# Patient Record
Sex: FEMALE | Race: WHITE | NOT HISPANIC OR LATINO | Employment: UNEMPLOYED | URBAN - NONMETROPOLITAN AREA
[De-identification: names, ages, dates, MRNs, and addresses within clinical notes are randomized per-mention and may not be internally consistent; named-entity substitution may affect disease eponyms.]

---

## 2020-11-19 ENCOUNTER — APPOINTMENT (OUTPATIENT)
Dept: MAMMOGRAPHY | Facility: HOSPITAL | Age: 45
End: 2020-11-19

## 2021-01-19 ENCOUNTER — HOSPITAL ENCOUNTER (OUTPATIENT)
Dept: MAMMOGRAPHY | Facility: HOSPITAL | Age: 46
Discharge: HOME | End: 2021-01-19
Admitting: NURSE PRACTITIONER

## 2021-01-19 DIAGNOSIS — Z12.31 VISIT FOR SCREENING MAMMOGRAM: ICD-10-CM

## 2021-01-19 PROCEDURE — 77067 SCR MAMMO BI INCL CAD: CPT

## 2021-01-19 PROCEDURE — 77063 BREAST TOMOSYNTHESIS BI: CPT | Performed by: RADIOLOGY

## 2021-01-19 PROCEDURE — 77063 BREAST TOMOSYNTHESIS BI: CPT

## 2021-01-19 PROCEDURE — 77067 SCR MAMMO BI INCL CAD: CPT | Performed by: RADIOLOGY

## 2021-04-06 ENCOUNTER — IMMUNIZATION (OUTPATIENT)
Dept: VACCINE CLINIC | Facility: HOSPITAL | Age: 46
End: 2021-04-06

## 2021-04-06 PROCEDURE — 91300 HC SARSCOV02 VAC 30MCG/0.3ML IM: CPT | Performed by: INTERNAL MEDICINE

## 2021-04-06 PROCEDURE — 0001A: CPT | Performed by: INTERNAL MEDICINE

## 2021-04-27 ENCOUNTER — IMMUNIZATION (OUTPATIENT)
Dept: VACCINE CLINIC | Facility: HOSPITAL | Age: 46
End: 2021-04-27

## 2021-04-27 PROCEDURE — 0002A: CPT | Performed by: INTERNAL MEDICINE

## 2021-04-27 PROCEDURE — 91300 HC SARSCOV02 VAC 30MCG/0.3ML IM: CPT | Performed by: INTERNAL MEDICINE

## 2022-03-16 ENCOUNTER — HOSPITAL ENCOUNTER (OUTPATIENT)
Dept: MAMMOGRAPHY | Facility: HOSPITAL | Age: 47
Discharge: HOME | End: 2022-03-16
Admitting: NURSE PRACTITIONER

## 2022-03-16 DIAGNOSIS — Z12.31 VISIT FOR SCREENING MAMMOGRAM: ICD-10-CM

## 2022-03-16 PROCEDURE — 77067 SCR MAMMO BI INCL CAD: CPT | Performed by: RADIOLOGY

## 2022-03-16 PROCEDURE — 77063 BREAST TOMOSYNTHESIS BI: CPT

## 2022-03-16 PROCEDURE — 77063 BREAST TOMOSYNTHESIS BI: CPT | Performed by: RADIOLOGY

## 2022-03-16 PROCEDURE — 77067 SCR MAMMO BI INCL CAD: CPT

## 2022-03-18 ENCOUNTER — APPOINTMENT (OUTPATIENT)
Dept: MAMMOGRAPHY | Facility: HOSPITAL | Age: 47
End: 2022-03-18

## 2022-10-24 ENCOUNTER — HOSPITAL ENCOUNTER (OUTPATIENT)
Dept: GENERAL RADIOLOGY | Facility: HOSPITAL | Age: 47
Discharge: HOME | End: 2022-10-24
Admitting: NURSE PRACTITIONER

## 2022-10-24 ENCOUNTER — TRANSCRIBE ORDERS (OUTPATIENT)
Dept: ADMINISTRATIVE | Facility: HOSPITAL | Age: 47
End: 2022-10-24

## 2022-10-24 DIAGNOSIS — R05.9 COUGH, UNSPECIFIED TYPE: Primary | ICD-10-CM

## 2022-10-24 DIAGNOSIS — R05.9 COUGH, UNSPECIFIED TYPE: ICD-10-CM

## 2022-10-24 PROCEDURE — 71046 X-RAY EXAM CHEST 2 VIEWS: CPT

## 2022-10-24 PROCEDURE — 71046 X-RAY EXAM CHEST 2 VIEWS: CPT | Performed by: RADIOLOGY

## 2022-10-28 ENCOUNTER — TRANSCRIBE ORDERS (OUTPATIENT)
Dept: ADMINISTRATIVE | Facility: HOSPITAL | Age: 47
End: 2022-10-28

## 2022-10-28 DIAGNOSIS — M54.50 LOW BACK PAIN, UNSPECIFIED BACK PAIN LATERALITY, UNSPECIFIED CHRONICITY, UNSPECIFIED WHETHER SCIATICA PRESENT: Primary | ICD-10-CM

## 2022-11-18 ENCOUNTER — HOSPITAL ENCOUNTER (OUTPATIENT)
Dept: MRI IMAGING | Facility: HOSPITAL | Age: 47
Discharge: HOME | End: 2022-11-18
Admitting: NURSE PRACTITIONER

## 2022-11-18 DIAGNOSIS — M54.50 LOW BACK PAIN, UNSPECIFIED BACK PAIN LATERALITY, UNSPECIFIED CHRONICITY, UNSPECIFIED WHETHER SCIATICA PRESENT: ICD-10-CM

## 2022-11-18 PROCEDURE — 72148 MRI LUMBAR SPINE W/O DYE: CPT | Performed by: RADIOLOGY

## 2022-11-18 PROCEDURE — 72148 MRI LUMBAR SPINE W/O DYE: CPT

## 2023-02-09 ENCOUNTER — OFFICE VISIT (OUTPATIENT)
Dept: NEUROSURGERY | Facility: CLINIC | Age: 48
End: 2023-02-09
Payer: COMMERCIAL

## 2023-02-09 VITALS — HEIGHT: 69 IN | WEIGHT: 177 LBS | BODY MASS INDEX: 26.22 KG/M2 | TEMPERATURE: 98.5 F

## 2023-02-09 DIAGNOSIS — G89.29 CHRONIC BILATERAL LOW BACK PAIN WITH RIGHT-SIDED SCIATICA: Primary | ICD-10-CM

## 2023-02-09 DIAGNOSIS — M51.36 DDD (DEGENERATIVE DISC DISEASE), LUMBAR: ICD-10-CM

## 2023-02-09 DIAGNOSIS — F43.9 STRESS: ICD-10-CM

## 2023-02-09 DIAGNOSIS — M54.41 CHRONIC BILATERAL LOW BACK PAIN WITH RIGHT-SIDED SCIATICA: Primary | ICD-10-CM

## 2023-02-09 PROBLEM — F32.A DEPRESSIVE DISORDER: Status: ACTIVE | Noted: 2022-05-20

## 2023-02-09 PROBLEM — M54.50 CHRONIC LOW BACK PAIN: Status: ACTIVE | Noted: 2022-05-20

## 2023-02-09 PROCEDURE — 99204 OFFICE O/P NEW MOD 45 MIN: CPT | Performed by: NEUROLOGICAL SURGERY

## 2023-02-09 RX ORDER — BACLOFEN 10 MG/1
10 TABLET ORAL 2 TIMES DAILY
COMMUNITY

## 2023-02-09 RX ORDER — ALBUTEROL SULFATE 90 UG/1
AEROSOL, METERED RESPIRATORY (INHALATION)
COMMUNITY

## 2023-02-09 RX ORDER — GABAPENTIN 600 MG/1
600 TABLET ORAL 3 TIMES DAILY
COMMUNITY

## 2023-02-09 RX ORDER — BUPROPION HYDROCHLORIDE 75 MG/1
75 TABLET ORAL DAILY
COMMUNITY

## 2023-02-09 NOTE — PATIENT INSTRUCTIONS
Recommendation from visit today with Dr. Moralez:  1.) Appointment with a Pain Management Doctor to help manage your chronic pain.  2.) A few weeks of Physical Therapy  3.) Appointment with our board certified /Pain Psychologist to discuss daily stressors.  4.) MRI right hip  5.) XRAY of hip  6.) XRAY of your back  7.) We will see you back in 90 days, once the above is complete with Dr. Moralez's Physician Assistant.

## 2023-02-09 NOTE — PROGRESS NOTES
NAME: DEAN DIAL   DOS: 2023  : 1975  PCP: Liz Daugherty APRN    Chief Complaint:    Chief Complaint   Patient presents with   • Chronic low back & right leg pain     Work accident in          History of Present Illness:  48 y.o. female   Saw this 48-year-old female in neurosurgical consultation she presents with a complex history of Worker's Comp. injury in .  She is currently filing for disability    She had pain since that time she worked as a dental hygienist and had a fall involving her right-sided buttock area since that time she had right-sided SI joint dysfunction ligamentous laxity SI joint pain with radiation of the right-sided gluteal and right-sided paraspinal area it does seem that it could be discogenic in origin but it could also be related to sciatic nerve bruising gluteal trauma or perhaps arthritis    She denies cauda equina syndrome the pain does not go below the knee she is here for evaluation she has no evidence of upper extremity issues other than right-sided shoulder pain she denies any weakness in her hands she does not smoke she is accompanied by a friend    PMHX  Allergies:  No Known Allergies  Medications    Current Outpatient Medications:   •  albuterol sulfate  (90 Base) MCG/ACT inhaler, Ventolin HFA 90 mcg/actuation aerosol inhaler  INHALE 2 PUFFS BY MOUTH EVERY 4 HOURS AS NEEDED, Disp: , Rfl:   •  baclofen (LIORESAL) 10 MG tablet, Take 10 mg by mouth 2 (Two) Times a Day., Disp: , Rfl:   •  buPROPion (WELLBUTRIN) 75 MG tablet, Take 75 mg by mouth Daily., Disp: , Rfl:   •  gabapentin (NEURONTIN) 600 MG tablet, Take 600 mg by mouth 3 (Three) Times a Day., Disp: , Rfl:   Past Medical History:  Past Medical History:   Diagnosis Date   • Asthma    • Low back pain      Past Surgical History:  Past Surgical History:   Procedure Laterality Date   •  SECTION     • HYSTEROSCOPY ENDOMETRIAL ABLATION       Social Hx:  Social History     Tobacco Use   •  Smoking status: Former     Types: Cigarettes   Vaping Use   • Vaping Use: Never used   Substance Use Topics   • Alcohol use: Never   • Drug use: Never     Family Hx:  Family History   Problem Relation Age of Onset   • Asthma Mother    • Arthritis Mother    • Breast cancer Neg Hx      Review of Systems:        Review of Systems   Constitutional: Positive for activity change, fatigue and unexpected weight change. Negative for appetite change, chills, diaphoresis and fever.   HENT: Negative for congestion, dental problem, drooling, ear discharge, ear pain, facial swelling, hearing loss, mouth sores, nosebleeds, postnasal drip, rhinorrhea, sinus pressure, sinus pain, sneezing, sore throat, tinnitus, trouble swallowing and voice change.    Eyes: Negative for photophobia, pain, discharge, redness, itching and visual disturbance.   Respiratory: Positive for wheezing. Negative for apnea, cough, choking, chest tightness, shortness of breath and stridor.    Cardiovascular: Negative for chest pain, palpitations and leg swelling.   Gastrointestinal: Negative for abdominal distention, abdominal pain, anal bleeding, blood in stool, constipation, diarrhea, nausea, rectal pain and vomiting.   Endocrine: Negative for cold intolerance, heat intolerance, polydipsia, polyphagia and polyuria.   Genitourinary: Negative for decreased urine volume, difficulty urinating, dyspareunia, dysuria, enuresis, flank pain, frequency, genital sores, hematuria, menstrual problem, pelvic pain, urgency, vaginal bleeding, vaginal discharge and vaginal pain.   Musculoskeletal: Positive for back pain and gait problem. Negative for arthralgias, joint swelling, myalgias, neck pain and neck stiffness.   Skin: Negative for color change, pallor, rash and wound.   Allergic/Immunologic: Negative for environmental allergies, food allergies and immunocompromised state.   Neurological: Negative for dizziness, tremors, seizures, syncope, facial asymmetry, speech  difficulty, weakness, light-headedness, numbness and headaches.   Hematological: Negative for adenopathy. Does not bruise/bleed easily.   Psychiatric/Behavioral: Negative for agitation, behavioral problems, confusion, decreased concentration, dysphoric mood, hallucinations, self-injury, sleep disturbance and suicidal ideas. The patient is not nervous/anxious and is not hyperactive.       I have reviewed this note template and all pertinent parts of the review of systems social, family history, surgical history and medication list      Physical Examination:  Vitals:    02/09/23 1014   Temp: 98.5 °F (36.9 °C)      General Appearance:   Well developed, well nourished, well groomed, alert, and cooperative.  Neurological examination:  Neurologic Exam  Vital signs were reviewed and documented in the chart  Patient appeared in good neurologic function with normal comprehension fluent speech  Mood and affect are normal  Sense of smell deferred  COVID mass left in place  Muscle bulk and tone normal  5 out of 5 strength no motor drift  Gait normal intact antalgic  Negative Romberg  No clonus long tract signs or myelopathy    Reflexes symmetric symmetrically brisk no edema noted and extremities skin appears normal  She is quite tight in her hips bilaterally  Straight leg raise sign absent on the left somewhat positive on the right  No signs of intrinsic hip dysfunction on the left positive on the right SI joint pain bilaterally  Back is without any lesions or abnormality  Feet are warm and well perfused  Tender in the gluteal right paraspinal muscles with spasm present      Review of Imaging/DATA:  MRI was personally reviewed of the lumbar spine demonstrates multilevel degenerative disc I compared this to a study in 2015 at L3-4 L4-5 and L5-S1 she has some synovial cyst formation on the right with relatively patent central canal    I see no evidence of clear-cut operative pathology but she could be harboring an remote annular  tear or compressive etiology under ambulation  EMG nerve conduction study was unrevealing  Diagnoses/Plan:    Ms. Osman is a 48 y.o. female   1.  Complex discogenic and regional pain syndrome related to  2.  Lumbar degenerative disc disease L3-4, L4-5, L5-S1 facet arthropathy  3.  SI joint dysfunction right side  4.  Right-sided intrinsic hip dysfunction internal/external rotation of the leg  5.  Possible old sciatic notch bruising secondary to fall and trauma  6.  Chronic pain syndrome right lower extremity  7.  Social stress-single mother filing for disability on fixed income    I explained the risk benefits and expected outcome of major elective surgery for their problem, complications from approach, and infection, the risk of neurologic implications after surgery as well as need for repeat surgeries and most importantly failure to achieve quality of life improvement from the surgery to the patient.  I explained the logic behind deferring any sort of spinal surgery until we sort out some other issues    From an anatomic standpoint she needs  An MRI of the right hip  Lumbar flexion-extension films  Possible repeat EMG nerve conduction study    From a pain management standpoint  She needs a follow-up with an interventional pain management and potentially a spinal cord stimulator trial  Diagnostic injection right transforaminal L4-5    From my standpoint we will support her in her disability application however I explained that I do not feel these types of paper works out    I like her to see Isabella our social and pain psychologist for  Stress management coping skills  Assistance with navigation of disability and work future    I will have her see my PA back in 90 to 120 days for repeat examination to review MRI of the hip joint ensure there is no gluteal pathology as well as check for flexion-extension films if at that time she is still having issues and we have sorted out the above social stressors and everything  else has been deemed to be reasonable and she wishes to proceed I recommend lumbar myelogram with repeat EMG nerve conduction study to look for anatomic issues and compressive etiology.  If that study is negative I would recommend spinal cord stimulator trial

## 2023-02-15 ENCOUNTER — TREATMENT (OUTPATIENT)
Dept: PHYSICAL THERAPY | Facility: CLINIC | Age: 48
End: 2023-02-15
Payer: COMMERCIAL

## 2023-02-15 DIAGNOSIS — M53.86 DECREASED ROM OF LUMBAR SPINE: ICD-10-CM

## 2023-02-15 DIAGNOSIS — G89.29 CHRONIC RIGHT-SIDED LOW BACK PAIN WITH RIGHT-SIDED SCIATICA: Primary | ICD-10-CM

## 2023-02-15 DIAGNOSIS — M54.41 CHRONIC RIGHT-SIDED LOW BACK PAIN WITH RIGHT-SIDED SCIATICA: Primary | ICD-10-CM

## 2023-02-15 DIAGNOSIS — M51.36 DDD (DEGENERATIVE DISC DISEASE), LUMBAR: ICD-10-CM

## 2023-02-15 PROCEDURE — 97162 PT EVAL MOD COMPLEX 30 MIN: CPT | Performed by: PHYSICAL THERAPIST

## 2023-02-15 NOTE — PROGRESS NOTES
"    Physical Therapy Initial Evaluation and Plan of Care    Patient: Fidelina Osman   : 1975  Diagnosis/ICD-10 Code:  Chronic right-sided low back pain with right-sided sciatica [M54.41, G89.29]  Referring practitioner: Israel Moralez MD  Date of Initial Visit: 2/15/2023  Today's Date: 2/15/2023  Patient seen for 1 session         Visit Diagnoses:    ICD-10-CM ICD-9-CM   1. Chronic right-sided low back pain with right-sided sciatica  M54.41 724.2    G89.29 724.3     338.29   2. DDD (degenerative disc disease), lumbar  M51.36 722.52   3. Decreased ROM of lumbar spine  M53.86 724.9         Subjective Questionnaire: Oswestry: 30/50=60% impaired      Subjective Evaluation    History of Present Illness  Onset date: 2014.  Mechanism of injury: Patient notes that she injured her back on 2014.  She states that she was diagnosed with bulging discs, and has previously tried PT and pain management.  Patient reports that the pain starts in her lower back \"above her butt.\"  She notes that it feels like a \"lot of pressure\" in the lower back.  She states that pain is the worst in the right hip.  Patient reports intermittent tingling in the right foot; she also notes burning in the right thigh.  Patient denies any changes in bowel/bladder function.      Patient Occupation: Unemployed Pain  Current pain ratin  At best pain ratin  At worst pain ratin  Location: Lumbar  Quality: burning, pressure, throbbing, knife-like and sharp  Relieving factors: rest, change in position and medications  Aggravating factors: movement, prolonged positioning, lifting, standing, ambulation, squatting, stairs, sleeping and repetitive movement    Diagnostic Tests  X-ray: abnormal    Patient Goals  Patient goals for therapy: decreased pain             Objective          Palpation   Left   Muscle spasm in the erector spinae and lumbar paraspinals.     Right   Muscle spasm in the erector spinae. Tenderness of the gluteus " prachi, gluteus medius and piriformis.     Tenderness     Lumbar Spine  Tenderness in the spinous process.     Right Hip   Tenderness in the PSIS.     Active Range of Motion     Lumbar   Flexion: Active lumbar flexion: 50%   Extension: Active lumbar extension: 25%   Left rotation: Active left lumbar rotation: 25%   Right rotation: Active right lumbar rotation: 25%     Strength/Myotome Testing     Left Hip   Planes of Motion   Flexion: 3+  Abduction: 3+  Adduction: 4-    Right Hip   Planes of Motion   Flexion: 3+  Abduction: 3+  Adduction: 4-    Left Knee   Flexion: 4-  Extension: 4-    Right Knee   Flexion: 3+  Extension: 4-    Tests       Thoracic   Positive slump.     Left Hip   Positive long sit.     Right Hip   Positive long sit.     Additional Tests Details  Repeated Flexion/Extension Test: extension preference reported  Long Sit Test: true leg length discrepancy noted-longer right LE.          Assessment & Plan     Assessment  Impairments: abnormal gait, abnormal or restricted ROM, activity intolerance, impaired physical strength, lacks appropriate home exercise program, pain with function and weight-bearing intolerance  Functional Limitations: carrying objects, lifting, sleeping, walking, pulling, pushing, uncomfortable because of pain, moving in bed, sitting, standing, stooping and unable to perform repetitive tasks  Assessment details: Patient is a 48 year old female who comes to physical therapy for low back pain with sciatica. The patient presents with increased pain, decreased lumbar ROM, and decreased LE strength. Positive special tests included Slump Test, which indicates adverse neural tension; true leg length discrepancy noted with Long Sit Test.  Patient reports a 60% functional mobility impairment, based on the patient's response to the LEFS.  Patient will benefit from skilled PT, so that patient can achieve maximum level of function.     Patient education provided on shoe inserts for leg length  discrepancy.  Prognosis: good    Goals  Plan Goals: SHORT TERM GOALS:     4 weeks  1. Patient will be independent/compliant with HEP.  2. Patient will report pain no greater than 6/10 when performing self-care activities.  3. Patient will report pain no greater than 6/10 when sitting to travel community distances.    LONG TERM GOALS:   8 weeks  1. Patient will show a 25% improvement of lumbar AROM to show improved ability to perform functional activities.  2. bilateral LE strength will improve to at least 4/5 to allow for greater ease with daily tasks.  3. Patient to report less than 40% impairment on the Modified Oswestry for improved functional mobility.  4. Pt to report worst pain no greater than 4/10 for improved quality of life.      Plan  Therapy options: will be seen for skilled therapy services  Planned modality interventions: cryotherapy, dry needling, electrical stimulation/Russian stimulation, TENS, thermotherapy (hydrocollator packs), traction, ultrasound and hydrotherapy  Planned therapy interventions: manual therapy, ADL retraining, balance/weight-bearing training, neuromuscular re-education, body mechanics training, postural training, soft tissue mobilization, flexibility, spinal/joint mobilization, functional ROM exercises, strengthening, gait training, stretching, home exercise program, therapeutic activities, IADL retraining, transfer training and joint mobilization  Frequency: 2x week  Duration in weeks: 8  Treatment plan discussed with: patient  Plan details: Moderate Evaluation  11260  Re-evaluation   43589    Therapeutic exercise  28792  Therapeutic activity    71912  Neuromuscular re-education   19594  Manual therapy   76808  Gait training  99718    Unattended e-stim (Medicaid/Medicare)     Moist heat/cryotherapy 66741   Ultrasound   35401  Mechanical traction 58512          History # of Personal Factors and/or Comorbidities: MODERATE (1-2)  Examination of Body System(s): # of elements:  MODERATE (3)  Clinical Presentation: STABLE   Clinical Decision Making: MODERATE      Timed:         Manual Therapy:         mins  71040;     Therapeutic Exercise:         mins  49437;     Neuromuscular Laura:        mins  30066;    Therapeutic Activity:          mins  86927;     Gait Training:           mins  83042;     Ultrasound:          mins  91655;    Ionto                                   mins   01386  Self Care                            mins   11130  Canalith Repos         mins 25605      Un-Timed:  Electrical Stimulation:        mins  78009 (MC );  Dry Needling          mins self-pay  Traction         mins 34510  Low Eval          Mins  01914  Mod Eval          Mins  63297  High Eval                            Mins  68469        Timed Treatment:      mins   Total Treatment:     35   mins          PT: Sabi Le PT     License Number: [id number]  Electronically signed by Sabi Le PT, 02/15/23, 9:37 AM EST    Certification Period: 2/15/2023 thru 5/15/2023  I certify that the therapy services are furnished while this patient is under my care.  The services outlined above are required by this patient, and will be reviewed every 90 days.         Physician Signature:__________________________________________________    PHYSICIAN: Israel Moralez MD  NPI: [id number]                                      DATE:      Please sign and return via fax to [email or web address] . Thank you, [facility].

## 2023-02-20 ENCOUNTER — TREATMENT (OUTPATIENT)
Dept: PHYSICAL THERAPY | Facility: CLINIC | Age: 48
End: 2023-02-20
Payer: COMMERCIAL

## 2023-02-20 DIAGNOSIS — G89.29 CHRONIC RIGHT-SIDED LOW BACK PAIN WITH RIGHT-SIDED SCIATICA: Primary | ICD-10-CM

## 2023-02-20 DIAGNOSIS — M54.41 CHRONIC RIGHT-SIDED LOW BACK PAIN WITH RIGHT-SIDED SCIATICA: Primary | ICD-10-CM

## 2023-02-20 DIAGNOSIS — M53.86 DECREASED ROM OF LUMBAR SPINE: ICD-10-CM

## 2023-02-20 DIAGNOSIS — M51.36 DDD (DEGENERATIVE DISC DISEASE), LUMBAR: ICD-10-CM

## 2023-02-20 PROCEDURE — 97110 THERAPEUTIC EXERCISES: CPT | Performed by: PHYSICAL THERAPIST

## 2023-02-20 PROCEDURE — 97140 MANUAL THERAPY 1/> REGIONS: CPT | Performed by: PHYSICAL THERAPIST

## 2023-02-20 PROCEDURE — 97014 ELECTRIC STIMULATION THERAPY: CPT | Performed by: PHYSICAL THERAPIST

## 2023-02-20 NOTE — PROGRESS NOTES
Physical Therapy Daily Treatment Note      Patient: Fidelina Osman   : 1975  Referring practitioner: Israel Moralez MD  Date of Initial Visit: Type: THERAPY  Noted: 2/15/2023  Today's Date: 2023  Patient seen for 2 sessions       Visit Diagnoses:    ICD-10-CM ICD-9-CM   1. Chronic right-sided low back pain with right-sided sciatica  M54.41 724.2    G89.29 724.3     338.29   2. DDD (degenerative disc disease), lumbar  M51.36 722.52   3. Decreased ROM of lumbar spine  M53.86 724.9       Subjective: Patient reports 7/10 low back and right hip pain upon arrival to therapy.      Objective   See Exercise, Manual, and Modality Logs for complete treatment.       Assessment/Plan: Patient completed today's session with reports of slight decrease in pain following, -6/10. Pt received manual soft tissue mobilization to bilateral lumbar paraspinals to address tightness and assist with pain control f/b therex as listed and conclusion of modalities. Exercise performed to assist with improved functional mobility, reduced radicular pain, and postural awareness. Pt provided with cues and demonstration as needed during exercise for form, and for max benefit. Pt noted with tightness bilaterally during manual therapy. Pt continues to benefit from therapy services and will be progressed as tolerated to address goals, reduce pain, and improve mobility. No adverse reactions observed during, and/ or following tx. Continue with PT's POC.       Timed:         Manual Therapy:    13     mins  46093;     Therapeutic Exercise:    21     mins  18622;     Neuromuscular Laura:        mins  53615;    Therapeutic Activity:          mins  35369;     Gait Training:           mins  28919;     Ultrasound:          mins  76315;    Ionto                                   mins   27137  Self Care                            mins   38274  Canalith Repos         mins 68385      Un-Timed:  Electrical Stimulation:   10      mins  61463 (  );  Dry Needling          mins self-pay  Traction          mins 25556      Timed Treatment:  34    mins   Total Treatment:    44    mins    Anay Montoya. MAULIK Cain  [place] License: [id number]

## 2023-02-22 ENCOUNTER — TREATMENT (OUTPATIENT)
Dept: PHYSICAL THERAPY | Facility: CLINIC | Age: 48
End: 2023-02-22
Payer: COMMERCIAL

## 2023-02-22 DIAGNOSIS — G89.29 CHRONIC RIGHT-SIDED LOW BACK PAIN WITH RIGHT-SIDED SCIATICA: Primary | ICD-10-CM

## 2023-02-22 DIAGNOSIS — M51.36 DDD (DEGENERATIVE DISC DISEASE), LUMBAR: ICD-10-CM

## 2023-02-22 DIAGNOSIS — M54.41 CHRONIC RIGHT-SIDED LOW BACK PAIN WITH RIGHT-SIDED SCIATICA: Primary | ICD-10-CM

## 2023-02-22 DIAGNOSIS — M53.86 DECREASED ROM OF LUMBAR SPINE: ICD-10-CM

## 2023-02-22 PROCEDURE — 97014 ELECTRIC STIMULATION THERAPY: CPT | Performed by: PHYSICAL THERAPIST

## 2023-02-22 PROCEDURE — 97110 THERAPEUTIC EXERCISES: CPT | Performed by: PHYSICAL THERAPIST

## 2023-02-22 PROCEDURE — 97140 MANUAL THERAPY 1/> REGIONS: CPT | Performed by: PHYSICAL THERAPIST

## 2023-02-22 NOTE — PROGRESS NOTES
Physical Therapy Daily Treatment Note      Patient: Fidelina Osman   : 1975  Referring practitioner: Israel Moralez MD  Date of Initial Visit: Type: THERAPY  Noted: 2/15/2023  Today's Date: 2023  Patient seen for 3 sessions       Visit Diagnoses:    ICD-10-CM ICD-9-CM   1. Chronic right-sided low back pain with right-sided sciatica  M54.41 724.2    G89.29 724.3     338.29   2. DDD (degenerative disc disease), lumbar  M51.36 722.52   3. Decreased ROM of lumbar spine  M53.86 724.9       Subjective Evaluation    History of Present Illness    Subjective comment: Patient reports that she has shoe inserts for leg length discrepancy and asks for help inserting them into her shoe.  Pain  Current pain ratin           Objective   See Exercise, Manual, and Modality Logs for complete treatment.       Assessment & Plan     Assessment    Assessment details: Patient tolerated therapy well, noting a decrease in pain to 5/10 post-tx.  Patient was assisted with shoe inserts for leg length discrepancy; will continue to monitor insert height to determine if it needs to be adjusted further.  There ex progressed to include increased repetitions, with exercises focusing on LE strengthening, postural awareness, and core strengthening.  Muscle guarding noted during STM at bilateral lumbar paraspinals.  Treatment concluded with ice/ESTIM, with no adverse reactions.  She will continue to be progressed per her tolerance and POC.          Timed:         Manual Therapy:    14     mins  23117;     Therapeutic Exercise:    27     mins  31052;     Neuromuscular Laura:        mins  80805;    Therapeutic Activity:          mins  32957;     Gait Training:           mins  08508;     Ultrasound:          mins  56118;    Ionto                                   mins   40160  Self Care                            mins   81677  Canalith Repos         mins 30740      Un-Timed:  Electrical Stimulation:    10     mins  15017 ( );  Dry  Needling          mins self-pay  Traction          mins 35698      Timed Treatment:   41   mins   Total Treatment:     51   mins    Sabi Le, PT  [place] License: [id number]  Electronically signed by Sabi Le PT, 02/22/23, 1:06 PM EST.

## 2023-02-24 ENCOUNTER — HOSPITAL ENCOUNTER (OUTPATIENT)
Dept: GENERAL RADIOLOGY | Facility: HOSPITAL | Age: 48
Discharge: HOME | End: 2023-02-24
Payer: COMMERCIAL

## 2023-02-24 ENCOUNTER — HOSPITAL ENCOUNTER (OUTPATIENT)
Dept: MRI IMAGING | Facility: HOSPITAL | Age: 48
Discharge: HOME | End: 2023-02-24
Payer: COMMERCIAL

## 2023-02-24 DIAGNOSIS — M54.41 CHRONIC BILATERAL LOW BACK PAIN WITH RIGHT-SIDED SCIATICA: ICD-10-CM

## 2023-02-24 DIAGNOSIS — G89.29 CHRONIC BILATERAL LOW BACK PAIN WITH RIGHT-SIDED SCIATICA: ICD-10-CM

## 2023-02-24 PROCEDURE — 72120 X-RAY BEND ONLY L-S SPINE: CPT

## 2023-02-24 PROCEDURE — 73721 MRI JNT OF LWR EXTRE W/O DYE: CPT | Performed by: RADIOLOGY

## 2023-02-24 PROCEDURE — 73721 MRI JNT OF LWR EXTRE W/O DYE: CPT

## 2023-02-24 PROCEDURE — 72120 X-RAY BEND ONLY L-S SPINE: CPT | Performed by: RADIOLOGY

## 2023-02-24 PROCEDURE — 73502 X-RAY EXAM HIP UNI 2-3 VIEWS: CPT

## 2023-02-24 PROCEDURE — 73502 X-RAY EXAM HIP UNI 2-3 VIEWS: CPT | Performed by: RADIOLOGY

## 2023-02-27 ENCOUNTER — TREATMENT (OUTPATIENT)
Dept: PHYSICAL THERAPY | Facility: CLINIC | Age: 48
End: 2023-02-27
Payer: COMMERCIAL

## 2023-02-27 DIAGNOSIS — M53.86 DECREASED ROM OF LUMBAR SPINE: ICD-10-CM

## 2023-02-27 DIAGNOSIS — M51.36 DDD (DEGENERATIVE DISC DISEASE), LUMBAR: ICD-10-CM

## 2023-02-27 DIAGNOSIS — G89.29 CHRONIC RIGHT-SIDED LOW BACK PAIN WITH RIGHT-SIDED SCIATICA: Primary | ICD-10-CM

## 2023-02-27 DIAGNOSIS — M54.41 CHRONIC RIGHT-SIDED LOW BACK PAIN WITH RIGHT-SIDED SCIATICA: Primary | ICD-10-CM

## 2023-02-27 PROCEDURE — 97014 ELECTRIC STIMULATION THERAPY: CPT | Performed by: PHYSICAL THERAPIST

## 2023-02-27 PROCEDURE — 97110 THERAPEUTIC EXERCISES: CPT | Performed by: PHYSICAL THERAPIST

## 2023-02-27 PROCEDURE — 97140 MANUAL THERAPY 1/> REGIONS: CPT | Performed by: PHYSICAL THERAPIST

## 2023-02-27 NOTE — PROGRESS NOTES
Physical Therapy Daily Treatment Note      Patient: Fidelina Osman   : 1975  Referring practitioner: Israel Moralez MD  Date of Initial Visit: Type: THERAPY  Noted: 2/15/2023  Today's Date: 2023  Patient seen for 4 sessions       Visit Diagnoses:    ICD-10-CM ICD-9-CM   1. Chronic right-sided low back pain with right-sided sciatica  M54.41 724.2    G89.29 724.3     338.29   2. DDD (degenerative disc disease), lumbar  M51.36 722.52   3. Decreased ROM of lumbar spine  M53.86 724.9       Subjective Evaluation    History of Present Illness    Subjective comment: Pt reports having 8/10 back and right hip pain.       Objective   See Exercise, Manual, and Modality Logs for complete treatment.       Assessment & Plan     Assessment    Assessment details: Tx today consisted of exercises progressed with increased reps and cues for mechanics.  Pt had difficulty with st back bends and prone press ups and bridges today.  Pt required cues for TB exercises.  Pt responded well to stm and ice and estim and reported decreased pain to 6/10 post tx.    Plan  Plan details: Will follow progressing core stability and centralization of symptoms.          Timed:         Manual Therapy:    14     mins  38167;     Therapeutic Exercise:    27     mins  33273;     Neuromuscular Laura:        mins  39076;    Therapeutic Activity:          mins  90215;     Gait Training:           mins  88841;     Ultrasound:          mins  80525;    Ionto                                   mins   55669  Self Care                            mins   60049  Canalith Repos         mins 24060      Un-Timed:  Electrical Stimulation:    10     mins  77752 ( );  Dry Needling          mins self-pay  Traction          mins 94739      Timed Treatment:   41   mins   Total Treatment:     51   mins    Clay Franklin PT  [place] License: [id number]      Electronically signed by Clay Franklin PT, 23, 9:34 AM EST

## 2023-03-01 ENCOUNTER — TREATMENT (OUTPATIENT)
Dept: PHYSICAL THERAPY | Facility: CLINIC | Age: 48
End: 2023-03-01
Payer: COMMERCIAL

## 2023-03-01 DIAGNOSIS — M51.36 DDD (DEGENERATIVE DISC DISEASE), LUMBAR: ICD-10-CM

## 2023-03-01 DIAGNOSIS — M54.41 CHRONIC RIGHT-SIDED LOW BACK PAIN WITH RIGHT-SIDED SCIATICA: Primary | ICD-10-CM

## 2023-03-01 DIAGNOSIS — M53.86 DECREASED ROM OF LUMBAR SPINE: ICD-10-CM

## 2023-03-01 DIAGNOSIS — G89.29 CHRONIC RIGHT-SIDED LOW BACK PAIN WITH RIGHT-SIDED SCIATICA: Primary | ICD-10-CM

## 2023-03-01 PROCEDURE — 97140 MANUAL THERAPY 1/> REGIONS: CPT | Performed by: PHYSICAL THERAPIST

## 2023-03-01 PROCEDURE — 97014 ELECTRIC STIMULATION THERAPY: CPT | Performed by: PHYSICAL THERAPIST

## 2023-03-01 PROCEDURE — 97110 THERAPEUTIC EXERCISES: CPT | Performed by: PHYSICAL THERAPIST

## 2023-03-01 NOTE — PROGRESS NOTES
Physical Therapy Daily Treatment Note      Patient: Fidelina Osman   : 1975  Referring practitioner: Israel Moralez MD  Date of Initial Visit: Type: THERAPY  Noted: 2/15/2023  Today's Date: 3/1/2023  Patient seen for 5 sessions       Visit Diagnoses:    ICD-10-CM ICD-9-CM   1. Chronic right-sided low back pain with right-sided sciatica  M54.41 724.2    G89.29 724.3     338.29   2. DDD (degenerative disc disease), lumbar  M51.36 722.52   3. Decreased ROM of lumbar spine  M53.86 724.9       Subjective: Patient reports 6-7/10 low back and right leg pain upon arrival to therapy. Pt states she is still adjusting to the shoe insert, with some soreness at the heel.     Objective   See Exercise, Manual, and Modality Logs for complete treatment.       Assessment/Plan: Supervising therapist, Sabi Le, PT, DPT notified and aware of pt's subjective reports. PT discussed possible option of custom insert for shoe or insert with more cushion w/ patient. PT and PTA educated pt to continue to monitor symptoms, and if insert continues to be bothersome, remove until adjustments are made; pt verbalized understanding. Treatment initiated with manual soft tissue mobilization to bilateral lumbar paraspinals to address tightness and assist with pain control f/b therex as listed and modalities at conclusion. Exercise performed with continued focus on extension activities. Resistance of theraband increased from yellow to red with mid rows w/ some fatigue reported; however tolerable. No adverse reactions observed during, and/ or following tx. Pt will be progressed with therapy as tolerated. Continue with PT's POC.       Timed:         Manual Therapy:    13    mins  76013;     Therapeutic Exercise:    36     mins  33796;     Neuromuscular Laura:        mins  68635;    Therapeutic Activity:          mins  87206;     Gait Training:           mins  81908;     Ultrasound:          mins  30125;    Ionto                                    mins   04783  Self Care                            mins   38816  Canalith Repos         mins 00789      Un-Timed:  Electrical Stimulation:    10     mins  14380 ( );  Dry Needling          mins self-pay  Traction          mins 16780      Timed Treatment: 49     mins   Total Treatment:    59    mins    Anay Montoya. MAULIK Cain  [place] License: [id number]

## 2023-03-02 ENCOUNTER — OFFICE VISIT (OUTPATIENT)
Dept: PAIN MEDICINE | Facility: CLINIC | Age: 48
End: 2023-03-02
Payer: COMMERCIAL

## 2023-03-02 VITALS
RESPIRATION RATE: 12 BRPM | DIASTOLIC BLOOD PRESSURE: 64 MMHG | WEIGHT: 177 LBS | HEART RATE: 93 BPM | HEIGHT: 69 IN | OXYGEN SATURATION: 97 % | TEMPERATURE: 97.5 F | BODY MASS INDEX: 26.22 KG/M2 | SYSTOLIC BLOOD PRESSURE: 100 MMHG

## 2023-03-02 DIAGNOSIS — M46.1 SACROILIITIS: Primary | ICD-10-CM

## 2023-03-02 PROCEDURE — 99204 OFFICE O/P NEW MOD 45 MIN: CPT | Performed by: STUDENT IN AN ORGANIZED HEALTH CARE EDUCATION/TRAINING PROGRAM

## 2023-03-02 RX ORDER — MONTELUKAST SODIUM 10 MG/1
10 TABLET ORAL
COMMUNITY
Start: 2023-02-20

## 2023-03-02 RX ORDER — MOMETASONE FUROATE AND FORMOTEROL FUMARATE DIHYDRATE 200; 5 UG/1; UG/1
2 AEROSOL RESPIRATORY (INHALATION) 2 TIMES DAILY
COMMUNITY
Start: 2023-02-20

## 2023-03-02 NOTE — PROGRESS NOTES
03/02/2023      Referring Physician: Israel Moralez MD  [address] [facility] [address]  [place] [address]    Primary Physician: iLz Daugherty APRN    CHIEF COMPLAINT or REASON FOR VISIT: Back Pain (New patient)      HISTORY OF PRESENT ILLNESS:  Ms. Fidelina Osman is 48 y.o. female who presents as a new patient referral for evaluation treatment of chronic right-sided low back pain with radiation of the right hip and thigh.  Patient states that she has had this issue for approximately 10 years since falling onto her right-sided buttock area in 2014.  This is an old Worker's Comp. injury, patient currently pursuing disability.  She describes a lifelong history of ligamentous laxity including joint hypermobility and pelvic shifting.  She has completed physical therapy and engages routinely in chiropractic manipulation.  She was told by her physical therapist that she has a leg length discrepancy, recently started using a left shoe spacer.  Her sister also has the same ligamentous laxity.    She reports chronic achy pain at the left buttock radiating into the right posterior thigh terminating above the knee.  She denies numbness or tingling.  She does have to sit leaning away from her right side.  Around 2014 she underwent 6 interventional pain injections including lumbar epidural steroid injections.  She states the epidural injections only helped for about 1 week.  She subsequently had right-sided sacroiliac joint injections which provided over a month of relief.    She did see consultation with neurosurgery, Dr. Israel Moralez, who recommended conservative management including diagnostic injections to determine etiology of her pain.      Objective Pain Scoring:   BRIEF PAIN INVENTORY:  Total score:   Pain Score    03/02/23 1022   PainSc:   7   PainLoc: Back  Comment: right hip radiating into leg      PHQ-2: PHQ-2 Total Score: 6  PHQ-9: PHQ-9: Brief Depression Severity Measure Score: 12  Opioid Risk Tool:   "       Review of Systems:   ROS negative except as otherwise noted     Past Medical History:   Past Medical History:   Diagnosis Date   • Asthma    • Low back pain          Past Surgical History:   Past Surgical History:   Procedure Laterality Date   •  SECTION     • HYSTEROSCOPY ENDOMETRIAL ABLATION           Family History   Family History   Problem Relation Age of Onset   • Asthma Mother    • Arthritis Mother    • Breast cancer Neg Hx          Social History   Social History     Socioeconomic History   • Marital status: Single   Tobacco Use   • Smoking status: Former     Types: Cigarettes   Vaping Use   • Vaping Use: Never used   Substance and Sexual Activity   • Alcohol use: Never   • Drug use: Never   • Sexual activity: Defer        Medications:     Current Outpatient Medications:   •  albuterol sulfate  (90 Base) MCG/ACT inhaler, Ventolin HFA 90 mcg/actuation aerosol inhaler  INHALE 2 PUFFS BY MOUTH EVERY 4 HOURS AS NEEDED, Disp: , Rfl:   •  baclofen (LIORESAL) 10 MG tablet, Take 10 mg by mouth 2 (Two) Times a Day., Disp: , Rfl:   •  buPROPion (WELLBUTRIN) 75 MG tablet, Take 75 mg by mouth Daily., Disp: , Rfl:   •  Dulera 200-5 MCG/ACT inhaler, Inhale 2 puffs 2 (Two) Times a Day., Disp: , Rfl:   •  gabapentin (NEURONTIN) 600 MG tablet, Take 600 mg by mouth 3 (Three) Times a Day., Disp: , Rfl:   •  montelukast (SINGULAIR) 10 MG tablet, Take 1 tablet by mouth every night at bedtime., Disp: , Rfl:         Physical Exam:     Vitals:    23 1022   BP: 100/64   BP Location: Left arm   Patient Position: Sitting   Cuff Size: Adult   Pulse: 93   Resp: 12   Temp: 97.5 °F (36.4 °C)   TempSrc: Infrared   SpO2: 97%   Weight: 80.3 kg (177 lb)   Height: 175.3 cm (69\")   PainSc:   7   PainLoc: Back  Comment: right hip radiating into leg        General: Alert and oriented, No acute distress.   HEENT: Normocephalic, atraumatic.   Cardiovascular: No gross edema  Respiratory: Respirations are " non-labored    Thoracic Spine:   Inspection: no gross abnormality  Paraspinal muscle palpation: nontender  Spinous process palpation: nontender    Lumbar Spine:   No masses or atrophy  Range of motion - Flexion normal. Extension normal.   Facet Loading: Negative bilaterally  Facet Palpation - Nontender   PSIS tenderness -positive bilaterally right greater than left  Tino's/GREG/Thigh thrust -positive bilaterally right greater than left  Straight leg raise: Negative bilaterally  Slump test: Negative bilaterally    Motor Exam:    Strength: Rate on 1-5 scale Right Left    L1/2- hip flexion 5 5    L3- knee extension 5 5    L4- ankle dorsiflexion 5 5    L5- great toe extension 5 5    S1- ankle plantarflexion 5 5    Sensory Exam: Full and equal sensation to light touch throughout.    Neurologic: Cranial Nerves II-XII are grossly intact.   Clonus -negative bilaterally  Psychiatric: Cooperative.   Gait: Normal   Assistive Devices: None    Imaging Studies:   Results for orders placed during the hospital encounter of 11/18/22    MRI Lumbar Spine Without Contrast    Narrative  EXAMINATION: MRI LUMBAR SPINE WO CONTRAST-    CLINICAL INDICATION: M54.50; M54.50-Low back pain, unspecified    COMPARISON: None    TECHNIQUE: Multiplanar, multisequence MR imaging performed through the  lumbar spine WITHOUT contrast.    FINDINGS:    HARDWARE: No MRI evidence of hardware.    NUMBERING/ALIGNMENT:?  5 lumbar vertebral body segments.  Intact vertebral body alignment.    SPINAL CORD:?  Conus terminates at the?L1 level.    BONES:  No fracture. No marrow signal abnormality.    POSTERIOR ELEMENTS:  Posterior elements are intact.    SOFT TISSUES:  The visualized paraspinal soft tissues are unremarkable.    T12-L1:  No disk bulge or protrusion.  No central canal or neuroforaminal  stenosis.    L1/2:  No disk bulge or protrusion.  No central canal or neuroforaminal  stenosis.    L2/3:  Mild annular disc bulge and small superimposed 2 mm  central disc  protrusion. No stenosis. No annular tear.    L3/4:  Broad-based posterior disc bulge eccentric to left. Mild facet  arthropathy. Mild central canal and left neural foraminal stenosis.    L4/5:  Broad-based posterior disc bulge. Moderate facet arthropathy.  Mild-moderate central canal stenosis. Mild neural foraminal stenosis.    L5/S1:  No disk bulge or protrusion.  No central canal or neuroforaminal  stenosis.    OTHER:  No additional remarkable findings.    Impression  1. Mild multilevel degenerative disc disease and mild to moderate facet  arthropathy with mild-moderate central canal and neural foraminal  stenosis at the L3/4 and L4/5 levels.  2. No fracture or traumatic malalignment.    This report was finalized on 11/18/2022 2:23 PM by Dr. Parmjit Scales MD.      Impression & Plan:   Ms. Fidelina Osman is a 48 y.o. female with past medical history significant for work fall in 2014 who presents to the pain clinic for evaluation and treatment of right-sided buttock pain.  I personally reviewed the patient's lumbar MRI dated 11/18/2022 which demonstrates moderate degenerative disc disease at L3/4 and L4/5 with Modic 1 endplate changes at L3/4; no significant canal or neuroforaminal stenosis.  Clinical examination most consistent with right sacroiliac joint pain versus greater trochanteric bursitis.  Given significant benefit from prior SIJ injections reasonable to repeat diagnostic and therapeutic right sacroiliac joint injection.  If short-term relief, will proceed with second diagnostic SIJ injection and can consider right sacroiliac joint fusion.  If no benefit whatsoever can consider right L4/5 transforaminal epidural steroid injection, SCS trial.    1. Sacroiliitis (HCC)        PLAN:  1. Medication Recommendations: Recommend Voltaren topical, NSAIDs, Tylenol.  Can trial turmeric 500 mg twice daily if NSAID contraindicated.    2. Physical Therapy: Continue HEP    3. Psychological: defer    4.  Complementary and alternative (CAM) Therapies:     5. Labs: None indicated     6. Imaging: MRI reviewed with patient utilizing 3D model to explain pathology    7. Interventions: Schedule diagnostic and therapeutic right sacroiliac joint injection (CPT 77277)    8. Referrals: None indicated     9. Records requested: n/a    10. Lifestyle goals:    Follow-up 1 month after injection      [facility]  Darwin Riley MD

## 2023-03-06 ENCOUNTER — TREATMENT (OUTPATIENT)
Dept: PHYSICAL THERAPY | Facility: CLINIC | Age: 48
End: 2023-03-06
Payer: COMMERCIAL

## 2023-03-06 DIAGNOSIS — G89.29 CHRONIC RIGHT-SIDED LOW BACK PAIN WITH RIGHT-SIDED SCIATICA: Primary | ICD-10-CM

## 2023-03-06 DIAGNOSIS — M51.36 DDD (DEGENERATIVE DISC DISEASE), LUMBAR: ICD-10-CM

## 2023-03-06 DIAGNOSIS — M54.41 CHRONIC RIGHT-SIDED LOW BACK PAIN WITH RIGHT-SIDED SCIATICA: Primary | ICD-10-CM

## 2023-03-06 DIAGNOSIS — M53.86 DECREASED ROM OF LUMBAR SPINE: ICD-10-CM

## 2023-03-06 PROCEDURE — 97140 MANUAL THERAPY 1/> REGIONS: CPT | Performed by: PHYSICAL THERAPIST

## 2023-03-06 PROCEDURE — 97014 ELECTRIC STIMULATION THERAPY: CPT | Performed by: PHYSICAL THERAPIST

## 2023-03-06 PROCEDURE — 97110 THERAPEUTIC EXERCISES: CPT | Performed by: PHYSICAL THERAPIST

## 2023-03-06 NOTE — PROGRESS NOTES
Physical Therapy Daily Treatment Note      Patient: Fidelina Osman   : 1975  Referring practitioner: Israel Moralez MD  Date of Initial Visit: Type: THERAPY  Noted: 2/15/2023  Today's Date: 3/6/2023  Patient seen for 6 sessions       Visit Diagnoses:    ICD-10-CM ICD-9-CM   1. Chronic right-sided low back pain with right-sided sciatica  M54.41 724.2    G89.29 724.3     338.29   2. DDD (degenerative disc disease), lumbar  M51.36 722.52   3. Decreased ROM of lumbar spine  M53.86 724.9       Subjective Evaluation    History of Present Illness    Subjective comment: Patient reports 6/10 pain today.  She notes that she saw pain management last week and they are considering different options (SI injection, SI fusion, spinal cord stimulator).Pain  Current pain ratin           Objective   See Exercise, Manual, and Modality Logs for complete treatment.       Assessment & Plan     Assessment    Assessment details: Therapy session initiated with manual therapy to the lumbar region to assist with pain control and lumbar mobility.  Muscle guarding was present at bilateral lumbar paraspinals during STM.  Patient performed there ex per flow sheet, with exercises focusing on LE strengthening, lumbar extension preference, core stabilization, and scapular stabilization.  Cues provided as necessary to ensure proper form with exercises.  Treatment session concluded with ice/ESTIM, with no adverse reactions.  She reported a decrease in pain to 3/10.  She will continue to be progressed per her tolerance and POC.          Timed:         Manual Therapy:    13     mins  82369;     Therapeutic Exercise:    15     mins  13623;     Neuromuscular Laura:        mins  07552;    Therapeutic Activity:          mins  55158;     Gait Training:           mins  57713;     Ultrasound:          mins  99576;    Ionto                                   mins   23386  Self Care                            mins   95369  [facility]          mins 85435      Un-Timed:  Electrical Stimulation:    10     mins  13910 ( );  Dry Needling          mins self-pay  Traction          mins 94447      Timed Treatment:   28   mins   Total Treatment:     38   mins    Sabi Le PT  [place] License: [id number]  Electronically signed by Sabi Le PT, 03/06/23, 11:45 AM EST.

## 2023-03-08 ENCOUNTER — TREATMENT (OUTPATIENT)
Dept: PHYSICAL THERAPY | Facility: CLINIC | Age: 48
End: 2023-03-08
Payer: COMMERCIAL

## 2023-03-08 DIAGNOSIS — G89.29 CHRONIC RIGHT-SIDED LOW BACK PAIN WITH RIGHT-SIDED SCIATICA: Primary | ICD-10-CM

## 2023-03-08 DIAGNOSIS — M54.41 CHRONIC RIGHT-SIDED LOW BACK PAIN WITH RIGHT-SIDED SCIATICA: Primary | ICD-10-CM

## 2023-03-08 DIAGNOSIS — M53.86 DECREASED ROM OF LUMBAR SPINE: ICD-10-CM

## 2023-03-08 DIAGNOSIS — M51.36 DDD (DEGENERATIVE DISC DISEASE), LUMBAR: ICD-10-CM

## 2023-03-08 PROCEDURE — 97140 MANUAL THERAPY 1/> REGIONS: CPT | Performed by: PHYSICAL THERAPIST

## 2023-03-08 PROCEDURE — 97014 ELECTRIC STIMULATION THERAPY: CPT | Performed by: PHYSICAL THERAPIST

## 2023-03-08 PROCEDURE — 97110 THERAPEUTIC EXERCISES: CPT | Performed by: PHYSICAL THERAPIST

## 2023-03-08 NOTE — PROGRESS NOTES
Physical Therapy Daily Treatment Note      Patient: Fidelina Osman   : 1975  Referring practitioner: Israel Moralez MD  Date of Initial Visit: Type: THERAPY  Noted: 2/15/2023  Today's Date: 3/8/2023  Patient seen for 7 sessions       Visit Diagnoses:    ICD-10-CM ICD-9-CM   1. Chronic right-sided low back pain with right-sided sciatica  M54.41 724.2    G89.29 724.3     338.29   2. DDD (degenerative disc disease), lumbar  M51.36 722.52   3. Decreased ROM of lumbar spine  M53.86 724.9       Subjective: Patient arrives to therapy with reports of 7/10 low back and right hip pain. Pt states she had difficulty sleeping last night, and was awake at 3 AM due to pain.      Objective   See Exercise, Manual, and Modality Logs for complete treatment.       Assessment/Plan: Patient completed today's session with reports of slight decrease in pain following, 6/10. Pt received manual soft tissue mobilization to bilateral lumbar paraspinals to address tightness and assist with pain control f/b therex as listed and modalities at conclusion. Pt noted with greater tightness along right paraspinals during manual therapy. Pt educated to perform activities to her tolerance w/ pt verbalizing understanding secondary to increased pain rating upon arrival. No signs of distress or adverse reactions observed during, and/ or following tx. Pt continues to benefit from therapy services and will be progressed as tolerated to address goals, reduce pain and improve mobility. Continue with PT's POC.       Timed:         Manual Therapy:    13     mins  29170;     Therapeutic Exercise:    34     mins  26633;     Neuromuscular Laura:        mins  56556;    Therapeutic Activity:          mins  07224;     Gait Training:           mins  48111;     Ultrasound:          mins  91984;    Ionto                                   mins   58608  Self Care                            mins   01926  Canalith Repos         mins  07178      Un-Timed:  Electrical Stimulation:   10      mins  67408 ( );  Dry Needling          mins self-pay  Traction          mins 88895      Timed Treatment:  47    mins   Total Treatment:     57   mins    Anay Montoya. MAULIK Cain  [place] License: [id number]

## 2023-03-13 ENCOUNTER — TELEPHONE (OUTPATIENT)
Dept: PHYSICAL THERAPY | Facility: CLINIC | Age: 48
End: 2023-03-13

## 2023-03-15 ENCOUNTER — TREATMENT (OUTPATIENT)
Dept: PHYSICAL THERAPY | Facility: CLINIC | Age: 48
End: 2023-03-15
Payer: COMMERCIAL

## 2023-03-15 DIAGNOSIS — G89.29 CHRONIC RIGHT-SIDED LOW BACK PAIN WITH RIGHT-SIDED SCIATICA: Primary | ICD-10-CM

## 2023-03-15 DIAGNOSIS — M54.41 CHRONIC RIGHT-SIDED LOW BACK PAIN WITH RIGHT-SIDED SCIATICA: Primary | ICD-10-CM

## 2023-03-15 DIAGNOSIS — M53.86 DECREASED ROM OF LUMBAR SPINE: ICD-10-CM

## 2023-03-15 DIAGNOSIS — M51.36 DDD (DEGENERATIVE DISC DISEASE), LUMBAR: ICD-10-CM

## 2023-03-15 PROCEDURE — 97014 ELECTRIC STIMULATION THERAPY: CPT | Performed by: PHYSICAL THERAPIST

## 2023-03-15 PROCEDURE — 97110 THERAPEUTIC EXERCISES: CPT | Performed by: PHYSICAL THERAPIST

## 2023-03-15 PROCEDURE — 97140 MANUAL THERAPY 1/> REGIONS: CPT | Performed by: PHYSICAL THERAPIST

## 2023-03-15 NOTE — PROGRESS NOTES
"  Physical Therapy Treatment/Discharge  Patient: Fidelina Osman   : 1975  Diagnosis/ICD-10 Code:  Chronic right-sided low back pain with right-sided sciatica [M54.41, G89.29]  Referring practitioner: Israel Moralez MD  Date of Initial Visit: Type: THERAPY  Noted: 2/15/2023  Today's Date: 3/15/2023  Patient seen for 8 sessions         Visit Diagnoses:    ICD-10-CM ICD-9-CM   1. Chronic right-sided low back pain with right-sided sciatica  M54.41 724.2    G89.29 724.3     338.29   2. DDD (degenerative disc disease), lumbar  M51.36 722.52   3. Decreased ROM of lumbar spine  M53.86 724.9       Subjective Questionnaire: Oswestry: =52% impaired  Clinical Progress: improved  Home Program Compliance: Yes    Subjective Evaluation    History of Present Illness    Subjective comment: Patient continues to report pain with bending over.  She also notes that she has pain with sitting for extended lengths of time, estimating a tolerance of 30-45 minutes.  Patient notes no change in the \"burning\" that she experiences in her right LE, noting that it extends to mid-thigh.Pain  Current pain ratin  At best pain ratin  At worst pain ratin             Objective          Active Range of Motion     Lumbar   Flexion: Active lumbar flexion: 50%   Extension: Active lumbar extension: 50%   Left rotation: Active left lumbar rotation: 50%   Right rotation: Active right lumbar rotation: 50%     Strength/Myotome Testing     Left Hip   Planes of Motion   Flexion: 3+  Abduction: 3+  Adduction: 4-    Right Hip   Planes of Motion   Flexion: 4-  Abduction: 3+  Adduction: 4-    Left Knee   Flexion: 4  Extension: 4    Right Knee   Flexion: 4-  Extension: 4-          Assessment & Plan     Assessment    Assessment details: Patient has been attending physical therapy for treatment of low back pain with sciatica; she has attended therapy for a total of 8 sessions.  Patient has shown some improvements in lumbar ROM and LE " strength.  She continues to report elevated pain levels, noting 5/10 pain at best and 9/10 pain at worst.  Patient will be discharged, due to reaching maximum level of function at this time.  Patient is encouraged to continue with HEP and attend follow-up MD appt.     Goals  Plan Goals: SHORT TERM GOALS:     4 weeks  1. Patient will be independent/compliant with HEP.  Met  2. Patient will report pain no greater than 6/10 when performing self-care activities.  Not met-up to 7/10 with self-care activities  3. Patient will report pain no greater than 6/10 when sitting to travel community distances.  Met    LONG TERM GOALS:   8 weeks  1. Patient will show a 25% improvement of lumbar AROM to show improved ability to perform functional activities.  Not met  2. bilateral LE strength will improve to at least 4/5 to allow for greater ease with daily tasks.   Not met  3. Patient to report less than 40% impairment on the Modified Oswestry for improved functional mobility.  Not met-52% impaired  4. Pt to report worst pain no greater than 4/10 for improved quality of life.  Not met-up to 9/10    Plan  Therapy options: will not be seen for skilled therapy services  Treatment plan discussed with: patient  Plan details: Patient to be discharged at conclusion of today's session.             Recommendations: Discharge      Timed:         Manual Therapy:    12     mins  55297;     Therapeutic Exercise:    33     mins  60360;     Neuromuscular Laura:        mins  77718;    Therapeutic Activity:          mins  13174;     Gait Training:           mins  69802;     Ultrasound:          mins  44460;    Ionto                                   mins   80697  Self Care                            mins   21136  Canalith Repos         mins 41173      Un-Timed:  Electrical Stimulation:    10     mins  05947 ( );  Dry Needling          mins self-pay  Traction          mins 45464  Re-Eval                               mins  83426      Timed  Treatment:   45   mins   Total Treatment:     55   mins          PT: Sabi Le PT     [place] License:  [id number]    Electronically signed by Sabi Le PT, 03/15/23, 9:38 AM EDT    Certification Period: 3/15/2023 thru 6/12/2023  I certify that the therapy services are furnished while this patient is under my care.  The services outlined above are required by this patient, and will be reviewed every 90 days.         Physician Signature:__________________________________________________    PHYSICIAN: Israel Moralez MD  NPI: [id number]                                      DATE:  :     Please sign and return via fax to [email or web address] . Thank you, [facility]

## 2023-03-21 ENCOUNTER — OUTSIDE FACILITY SERVICE (OUTPATIENT)
Dept: PAIN MEDICINE | Facility: CLINIC | Age: 48
End: 2023-03-21
Payer: COMMERCIAL

## 2023-03-21 ENCOUNTER — DOCUMENTATION (OUTPATIENT)
Dept: PAIN MEDICINE | Facility: CLINIC | Age: 48
End: 2023-03-21

## 2023-03-21 PROCEDURE — 27096 INJECT SACROILIAC JOINT: CPT | Performed by: STUDENT IN AN ORGANIZED HEALTH CARE EDUCATION/TRAINING PROGRAM

## 2023-03-21 NOTE — PROGRESS NOTES
[facility]  [address] [place] [id number]      Sacroiliac Joint Injection    PROCEDURE: Fluoroscopically-Guided Sacroiliac Joint Injection -right-  Sided    PRE-OP DIAGNOSIS: Sacroiliac joint pain  POST-OP DIAGNOSIS: Sacroiliac joint pain    BLOOD THINNERS (ANTIPLATELETS/ANTICOAGULANTS): Were discussed with the patient and ALISSA Guidelines were followed.    CONSENT: Risks, benefits and options were explained to the patient, all questions were answered and written informed consent was obtained.     ANESTHESIA: See Anesthesia note.    PROCEDURE NOTE: The patient was placed prone with the abdomen supported by a pillow and all pressure points were padded. Standard ASA monitors were applied. A timeout protocol was performed. Proper protective gear was worn by the physician including a mask, scrub cap, and sterile gloves. The patient's low back and sacral region were prepped with chlorhexidine and draped in a sterile fashion. Fluoroscopic guidance was used to identify the inferior and posterior opening to the right sacroiliac joint. Under intermittent fluoroscopic guidance, the tip of a 25-gauge, 3.5-inch spinal needle with bent tip was advanced toward the inferior aspect of the sacroiliac joint opening and advanced slightly into the joint space. Appropriate needle tip position was confirmed in the AP and lateral view. After negative aspiration, a total of 0.5 mL of Omnipaque was injected with an acceptable arthrogram outlining the sacroiliac joint and no vascular uptake. Next, a mixture containing 40mg methylprednisolone with 2cc lidocaine 1% for a total volume of 3 ml was injected without any complications. The needles were removed and sterile bandages applied at the needle sites. The patient was transferred to the stretcher and taken to recovery in stable condition.    EBL: None  COMPLICATIONS: None    The patient tolerated the procedure well. Vital signs were stable. Sensory and  motor exam was unchanged from baseline. The patient was observed in recovery for 30 minutes and was ambulating at baseline upon leaving the procedure area.    FOLLOW UP: as scheduled     ADDITIONAL NOTES: [n/a]    [facility]  Darwin Riley MD

## 2023-04-24 ENCOUNTER — OFFICE VISIT (OUTPATIENT)
Dept: NEUROSURGERY | Facility: CLINIC | Age: 48
End: 2023-04-24
Payer: COMMERCIAL

## 2023-04-24 ENCOUNTER — OFFICE VISIT (OUTPATIENT)
Dept: PAIN MEDICINE | Facility: CLINIC | Age: 48
End: 2023-04-24
Payer: COMMERCIAL

## 2023-04-24 VITALS
TEMPERATURE: 96.4 F | HEIGHT: 69 IN | BODY MASS INDEX: 27.05 KG/M2 | SYSTOLIC BLOOD PRESSURE: 116 MMHG | DIASTOLIC BLOOD PRESSURE: 70 MMHG | HEART RATE: 94 BPM | WEIGHT: 182.6 LBS | RESPIRATION RATE: 12 BRPM | OXYGEN SATURATION: 96 %

## 2023-04-24 VITALS
HEIGHT: 69 IN | WEIGHT: 180.4 LBS | DIASTOLIC BLOOD PRESSURE: 78 MMHG | SYSTOLIC BLOOD PRESSURE: 122 MMHG | BODY MASS INDEX: 26.72 KG/M2

## 2023-04-24 DIAGNOSIS — G89.29 CHRONIC MIDLINE LOW BACK PAIN WITHOUT SCIATICA: Primary | ICD-10-CM

## 2023-04-24 DIAGNOSIS — M54.50 CHRONIC MIDLINE LOW BACK PAIN WITHOUT SCIATICA: Primary | ICD-10-CM

## 2023-04-24 DIAGNOSIS — M46.1 SACROILIITIS: Primary | ICD-10-CM

## 2023-04-24 PROCEDURE — 99213 OFFICE O/P EST LOW 20 MIN: CPT | Performed by: PHYSICIAN ASSISTANT

## 2023-04-24 PROCEDURE — 1125F AMNT PAIN NOTED PAIN PRSNT: CPT | Performed by: STUDENT IN AN ORGANIZED HEALTH CARE EDUCATION/TRAINING PROGRAM

## 2023-04-24 PROCEDURE — 99213 OFFICE O/P EST LOW 20 MIN: CPT | Performed by: STUDENT IN AN ORGANIZED HEALTH CARE EDUCATION/TRAINING PROGRAM

## 2023-04-24 RX ORDER — LORATADINE 10 MG/1
10 TABLET ORAL EVERY MORNING
COMMUNITY
Start: 2023-04-20

## 2023-04-24 NOTE — PROGRESS NOTES
Patient: Fidelina Osman  : 1975    Primary Care Provider: Liz Daugherty APRN      Chief Complaint: Intermittent low back pain right buttock pain    History of Present Illness:       Patient is a very nice 48-year-old female who saw Dr. Moralez with history of degenerative disc disease.  Patient fell on the job while being placed after about 9 years ago which has rendered her medically disabled.  Patient got an injection in the right SI joint really helped a lot of her symptoms and 80% of her pain improved after that injection with Dr. Riley.    Patient is back today to discuss her low back issues.    Patient states that her low back is intermittent and hit and miss sound when it will hurt but it does fluctuate with weather and is worse in the mornings.    Patient denies any lower extremity radiculopathy and I have cautioned her about proceeding with any types of surgeries for her low back for her low back pain.    Patient going continue working with Dr. Riley for SI joint which is the predominant source of her pain at this point    Review of Systems   Constitutional: Negative.    HENT: Negative.    Eyes: Negative.    Respiratory: Negative.    Cardiovascular: Negative.    Gastrointestinal: Negative.    Genitourinary: Negative.    Musculoskeletal: Positive for back pain.        RT HIP PAIN   Skin: Negative.    Allergic/Immunologic: Negative.    Neurological: Negative.    Hematological: Negative.    Psychiatric/Behavioral: Negative.    All other systems reviewed and are negative.      Past Medical History:     Past Medical History:   Diagnosis Date   • Asthma    • Low back pain    • Lumbosacral disc disease        Family History:     Family History   Problem Relation Age of Onset   • Asthma Mother    • Arthritis Mother    • Breast cancer Neg Hx        Social History:    reports that she has quit smoking. Her smoking use included cigarettes. She does not have any smokeless tobacco history on file.  "She reports that she does not drink alcohol and does not use drugs.   SMOKING STATUS: Non-smoker    Surgical History:     Past Surgical History:   Procedure Laterality Date   •  SECTION     • HYSTEROSCOPY ENDOMETRIAL ABLATION         Allergies:   Patient has no known allergies.    Physical Exam:    Vital Signs:/78 (BP Location: Right arm, Patient Position: Sitting, Cuff Size: Adult)   Ht 175.3 cm (69.02\")   Wt 81.8 kg (180 lb 6.4 oz)   BMI 26.63 kg/m²    BMI: Body mass index is 26.63 kg/m².     GENERAL:           The patient is in no acute distress, and is able to answer all questions appropriately.    Neck:          Supple without lymphadenopathy    Cardiovascular:       Peripheral pulses 2+ at dorsalis pedis and posterior tibialis    Lungs:         Breathing unlabored    Musculoskeletal:            strength is 5 out of 5 bilaterally.        Shoulder abduction is 5 out of 5.         Dorsiflexion is 5/5 Bilaterally       Plantarflexion is 5/5 bilaterally       Hip Flexion 5/5 bilaterally.  Causes pain in the buttocks on the right       The patient´s gait is normal without antalgia.    Neurologic:          The patient is alert and oriented by 3.          Pupils are equal and reactive to light.         Visual fields are full.         Extraocular movements are intact without nystagmus.         There is no evidence of central motor drift. No facial droop.  No difficulty with rapid alternating movements.         Sensation is equal bilaterally with no deficit.           Reflexes:  2+ through out    CRANIAL NERVES:    Deferred  Medical Decision Making    Data Review:   No new films reviewed at this visit    Diagnosis:   Right SI joint pain  Chronic low back pain    Treatment Options:   From neurosurgical perspective we will see her back after she continues working up with Dr. Riley.  Hopefully she will continue getting relief with SI joint injections which would avoid her needing anything further " from us.    I have educated her that typically the pain would be constant and associated with some sciatic type derivative if we were to consider intervening.    Patient really wishes to avoid any surgeries at this time    BMI is >= 25 and <30. (Overweight) The following options were offered after discussion;: weight loss educational material (shared in after visit summary)     Diagnosis Plan   1. Chronic midline low back pain without sciatica

## 2023-04-24 NOTE — PROGRESS NOTES
Primary Physician: Liz Daugherty APRN    CHIEF COMPLAINT or REASON FOR VISIT: Back Pain and Follow-up      Initial HPI 3-2-2023:  Ms. Fidelina Osman is 48 y.o. female who presents as a new patient referral for evaluation treatment of chronic right-sided low back pain with radiation of the right hip and thigh.  Patient states that she has had this issue for approximately 10 years since falling onto her right-sided buttock area in 2014.  This is an old Worker's Comp. injury, patient currently pursuing disability.  She describes a lifelong history of ligamentous laxity including joint hypermobility and pelvic shifting.  She has completed physical therapy and engages routinely in chiropractic manipulation.  She was told by her physical therapist that she has a leg length discrepancy, recently started using a left shoe spacer.  Her sister also has the same ligamentous laxity.    She reports chronic achy pain at the left buttock radiating into the right posterior thigh terminating above the knee.  She denies numbness or tingling.  She does have to sit leaning away from her right side.  Around 2014 she underwent 6 interventional pain injections including lumbar epidural steroid injections.  She states the epidural injections only helped for about 1 week.  She subsequently had right-sided sacroiliac joint injections which provided over a month of relief.    She did see consultation with neurosurgery, Dr. Israel Moralez, who recommended conservative management including diagnostic injections to determine etiology of her pain.    Interval history: Patient turns to clinic after undergoing a right SI injection on 3/21.  Patient reports excellent benefit from the injection.  She did not even realize how much this pain was affecting her sleep as she now sleeps much better.  No side effects.    Interventions:    3/21/2023: Right SIJ injection with 1% relief for 3 weeks, now 75% relief ongoing    Objective Pain Scoring:   BRIEF  PAIN INVENTORY:  Total score:   Pain Score    23 0902   PainSc:   5   PainLoc: Back      PHQ-2: PHQ-2 Total Score: 1  PHQ-9: PHQ-9: Brief Depression Severity Measure Score: 1  Opioid Risk Tool:         Review of Systems:   ROS negative except as otherwise noted     Past Medical History:   Past Medical History:   Diagnosis Date   • Asthma    • Low back pain          Past Surgical History:   Past Surgical History:   Procedure Laterality Date   •  SECTION     • HYSTEROSCOPY ENDOMETRIAL ABLATION           Family History   Family History   Problem Relation Age of Onset   • Asthma Mother    • Arthritis Mother    • Breast cancer Neg Hx          Social History   Social History     Socioeconomic History   • Marital status: Single   Tobacco Use   • Smoking status: Former     Types: Cigarettes   Vaping Use   • Vaping Use: Never used   Substance and Sexual Activity   • Alcohol use: Never   • Drug use: Never   • Sexual activity: Defer        Medications:     Current Outpatient Medications:   •  albuterol sulfate  (90 Base) MCG/ACT inhaler, Ventolin HFA 90 mcg/actuation aerosol inhaler  INHALE 2 PUFFS BY MOUTH EVERY 4 HOURS AS NEEDED, Disp: , Rfl:   •  baclofen (LIORESAL) 10 MG tablet, Take 1 tablet by mouth 2 (Two) Times a Day., Disp: , Rfl:   •  buPROPion (WELLBUTRIN) 75 MG tablet, Take 1 tablet by mouth Daily., Disp: , Rfl:   •  Dulera 200-5 MCG/ACT inhaler, Inhale 2 puffs 2 (Two) Times a Day., Disp: , Rfl:   •  gabapentin (NEURONTIN) 600 MG tablet, Take 1 tablet by mouth 3 (Three) Times a Day., Disp: , Rfl:   •  loratadine (CLARITIN) 10 MG tablet, Take 1 tablet by mouth Every Morning., Disp: , Rfl:   •  montelukast (SINGULAIR) 10 MG tablet, Take 1 tablet by mouth every night at bedtime., Disp: , Rfl:         Physical Exam:     Vitals:    23 0902   BP: 116/70   BP Location: Left arm   Patient Position: Sitting   Cuff Size: Adult   Pulse: 94   Resp: 12   Temp: 96.4 °F (35.8 °C)   TempSrc: Infrared  "  SpO2: 96%   Weight: 82.8 kg (182 lb 9.6 oz)   Height: 175.3 cm (69\")   PainSc:   5   PainLoc: Back        General: Alert and oriented, No acute distress.   HEENT: Normocephalic, atraumatic.   Cardiovascular: No gross edema  Respiratory: Respirations are non-labored    Thoracic Spine:   Inspection: no gross abnormality  Paraspinal muscle palpation: nontender  Spinous process palpation: nontender    Lumbar Spine:   No masses or atrophy  Range of motion - Flexion normal. Extension normal.   Facet Loading: Negative bilaterally  Facet Palpation - Nontender   PSIS tenderness -positive bilaterally right greater than left  Tino's/GREG/Thigh thrust -positive bilaterally right greater than left  Straight leg raise: Negative bilaterally  Slump test: Negative bilaterally    Motor Exam:    Strength: Rate on 1-5 scale Right Left    L1/2- hip flexion 5 5    L3- knee extension 5 5    L4- ankle dorsiflexion 5 5    L5- great toe extension 5 5    S1- ankle plantarflexion 5 5    Sensory Exam: Full and equal sensation to light touch throughout.    Neurologic: Cranial Nerves II-XII are grossly intact.   Clonus -negative bilaterally  Psychiatric: Cooperative.   Gait: Normal   Assistive Devices: None    Imaging Studies:   Results for orders placed during the hospital encounter of 11/18/22    MRI Lumbar Spine Without Contrast    Narrative  EXAMINATION: MRI LUMBAR SPINE WO CONTRAST-    CLINICAL INDICATION: M54.50; M54.50-Low back pain, unspecified    COMPARISON: None    TECHNIQUE: Multiplanar, multisequence MR imaging performed through the  lumbar spine WITHOUT contrast.    FINDINGS:    HARDWARE: No MRI evidence of hardware.    NUMBERING/ALIGNMENT:?  5 lumbar vertebral body segments.  Intact vertebral body alignment.    SPINAL CORD:?  Conus terminates at the?L1 level.    BONES:  No fracture. No marrow signal abnormality.    POSTERIOR ELEMENTS:  Posterior elements are intact.    SOFT TISSUES:  The visualized paraspinal soft tissues are " unremarkable.    T12-L1:  No disk bulge or protrusion.  No central canal or neuroforaminal  stenosis.    L1/2:  No disk bulge or protrusion.  No central canal or neuroforaminal  stenosis.    L2/3:  Mild annular disc bulge and small superimposed 2 mm central disc  protrusion. No stenosis. No annular tear.    L3/4:  Broad-based posterior disc bulge eccentric to left. Mild facet  arthropathy. Mild central canal and left neural foraminal stenosis.    L4/5:  Broad-based posterior disc bulge. Moderate facet arthropathy.  Mild-moderate central canal stenosis. Mild neural foraminal stenosis.    L5/S1:  No disk bulge or protrusion.  No central canal or neuroforaminal  stenosis.    OTHER:  No additional remarkable findings.    Impression  1. Mild multilevel degenerative disc disease and mild to moderate facet  arthropathy with mild-moderate central canal and neural foraminal  stenosis at the L3/4 and L4/5 levels.  2. No fracture or traumatic malalignment.    This report was finalized on 11/18/2022 2:23 PM by Dr. Parmjit Scales MD.      Impression & Plan:   3/2/2023: Fidelina Osman is a 48 y.o. female with past medical history significant for work fall in 2014 who presents to the pain clinic for evaluation and treatment of right-sided buttock pain.  I personally reviewed the patient's lumbar MRI dated 11/18/2022 which demonstrates moderate degenerative disc disease at L3/4 and L4/5 with Modic 1 endplate changes at L3/4; no significant canal or neuroforaminal stenosis.  Clinical examination most consistent with right sacroiliac joint pain versus greater trochanteric bursitis.  Given significant benefit from prior SIJ injections reasonable to repeat diagnostic and therapeutic right sacroiliac joint injection.  If short-term relief, will proceed with second diagnostic SIJ injection and can consider right sacroiliac joint fusion.  If no benefit whatsoever can consider right L4/5 transforaminal epidural steroid injection, SCS  trial.  4/24/2023: Excellent relief with right SIJ.  Discussed repeat every 3 to 4 months versus fusion.    1. Sacroiliitis        PLAN:  1. Medication Recommendations: Recommend Voltaren topical, NSAIDs, Tylenol.  Can trial turmeric 500 mg twice daily if NSAID contraindicated.    2. Physical Therapy: Continue HEP    3. Psychological: defer    4. Complementary and alternative (CAM) Therapies:     5. Labs: None indicated     6. Imaging: MRI reviewed with patient utilizing 3D model to explain pathology    7. Interventions: Consider repeat right sacroiliac joint injection (CPT 89144)    8. Referrals: None indicated     9. Records requested: n/a    10. Lifestyle goals:    Follow-up 3 months      [facility]  Darwin Riley MD

## 2023-07-26 ENCOUNTER — OFFICE VISIT (OUTPATIENT)
Dept: PAIN MEDICINE | Facility: CLINIC | Age: 48
End: 2023-07-26
Payer: COMMERCIAL

## 2023-07-26 VITALS
HEART RATE: 94 BPM | TEMPERATURE: 97.5 F | SYSTOLIC BLOOD PRESSURE: 122 MMHG | WEIGHT: 178 LBS | BODY MASS INDEX: 26.27 KG/M2 | DIASTOLIC BLOOD PRESSURE: 66 MMHG | OXYGEN SATURATION: 95 %

## 2023-07-26 DIAGNOSIS — M46.1 SACROILIITIS: Primary | ICD-10-CM

## 2023-07-26 PROCEDURE — 1125F AMNT PAIN NOTED PAIN PRSNT: CPT | Performed by: STUDENT IN AN ORGANIZED HEALTH CARE EDUCATION/TRAINING PROGRAM

## 2023-07-26 PROCEDURE — 1159F MED LIST DOCD IN RCRD: CPT | Performed by: STUDENT IN AN ORGANIZED HEALTH CARE EDUCATION/TRAINING PROGRAM

## 2023-07-26 PROCEDURE — 1160F RVW MEDS BY RX/DR IN RCRD: CPT | Performed by: STUDENT IN AN ORGANIZED HEALTH CARE EDUCATION/TRAINING PROGRAM

## 2023-07-26 PROCEDURE — 99213 OFFICE O/P EST LOW 20 MIN: CPT | Performed by: STUDENT IN AN ORGANIZED HEALTH CARE EDUCATION/TRAINING PROGRAM

## 2023-07-26 NOTE — PROGRESS NOTES
Primary Physician: Liz Daugherty APRN    CHIEF COMPLAINT or REASON FOR VISIT: No chief complaint on file.      Initial HPI 3-2-2023:  Ms. Fidelina Osman is 48 y.o. female who presents as a new patient referral for evaluation treatment of chronic right-sided low back pain with radiation of the right hip and thigh.  Patient states that she has had this issue for approximately 10 years since falling onto her right-sided buttock area in 2014.  This is an old Worker's Comp. injury, patient currently pursuing disability.  She describes a lifelong history of ligamentous laxity including joint hypermobility and pelvic shifting.  She has completed physical therapy and engages routinely in chiropractic manipulation.  She was told by her physical therapist that she has a leg length discrepancy, recently started using a left shoe spacer.  Her sister also has the same ligamentous laxity.    She reports chronic achy pain at the left buttock radiating into the right posterior thigh terminating above the knee.  She denies numbness or tingling.  She does have to sit leaning away from her right side.  Around 2014 she underwent 6 interventional pain injections including lumbar epidural steroid injections.  She states the epidural injections only helped for about 1 week.  She subsequently had right-sided sacroiliac joint injections which provided over a month of relief.    She did see consultation with neurosurgery, Dr. Israel Moralez, who recommended conservative management including diagnostic injections to determine etiology of her pain.    Interval history: Patient returns to clinic with continued right-sided buttock pain.  Unfortunately previous injection was working very well until she had a fall in early June onto her right buttock side.  This caused increased pain.  She has been seeing a chiropractor which is somewhat helpful.    Interventions:    3/21/2023: Right SIJ injection with 100% relief for 3 weeks, now 75% relief 2  to 3 months    Objective Pain Scoring:   BRIEF PAIN INVENTORY:  Total score:   Pain Score    23 1336   PainSc:   6   PainLoc: Buttocks  Comment: / hip pain - right side and center      PHQ-2: PHQ-2 Total Score: 2  PHQ-9: PHQ-9: Brief Depression Severity Measure Score: 6  Opioid Risk Tool:         Review of Systems:   ROS negative except as otherwise noted     Past Medical History:   Past Medical History:   Diagnosis Date    Asthma     Low back pain     Lumbosacral disc disease          Past Surgical History:   Past Surgical History:   Procedure Laterality Date     SECTION      HYSTEROSCOPY ENDOMETRIAL ABLATION           Family History   Family History   Problem Relation Age of Onset    Asthma Mother     Arthritis Mother     Breast cancer Neg Hx          Social History   Social History     Socioeconomic History    Marital status: Single   Tobacco Use    Smoking status: Former     Types: Cigarettes   Vaping Use    Vaping Use: Never used   Substance and Sexual Activity    Alcohol use: Never    Drug use: Never    Sexual activity: Defer        Medications:     Current Outpatient Medications:     albuterol sulfate  (90 Base) MCG/ACT inhaler, Ventolin HFA 90 mcg/actuation aerosol inhaler  INHALE 2 PUFFS BY MOUTH EVERY 4 HOURS AS NEEDED, Disp: , Rfl:     baclofen (LIORESAL) 10 MG tablet, Take 1 tablet by mouth 2 (Two) Times a Day., Disp: , Rfl:     buPROPion (WELLBUTRIN) 75 MG tablet, Take 1 tablet by mouth Daily., Disp: , Rfl:     Dulera 200-5 MCG/ACT inhaler, Inhale 2 puffs 2 (Two) Times a Day., Disp: , Rfl:     gabapentin (NEURONTIN) 600 MG tablet, Take 1 tablet by mouth 3 (Three) Times a Day., Disp: , Rfl:     loratadine (CLARITIN) 10 MG tablet, Take 1 tablet by mouth Every Morning., Disp: , Rfl:     montelukast (SINGULAIR) 10 MG tablet, Take 1 tablet by mouth every night at bedtime., Disp: , Rfl:         Physical Exam:     Vitals:    23 1336   BP: 122/66   BP Location: Left arm   Patient  Position: Sitting   Pulse: 94   Temp: 97.5 °F (36.4 °C)   TempSrc: Temporal   SpO2: 95%   Weight: 80.7 kg (178 lb)   PainSc:   6   PainLoc: Buttocks  Comment: / hip pain - right side and center        General: Alert and oriented, No acute distress.   HEENT: Normocephalic, atraumatic.   Cardiovascular: No gross edema  Respiratory: Respirations are non-labored    Thoracic Spine:   Inspection: no gross abnormality  Paraspinal muscle palpation: nontender  Spinous process palpation: nontender    Lumbar Spine:   No masses or atrophy  Range of motion - Flexion normal. Extension normal.   Facet Loading: Negative bilaterally  Facet Palpation - Nontender   PSIS tenderness -positive bilaterally right greater than left  Tino's/GREG/Thigh thrust -positive bilaterally right greater than left  Straight leg raise: Negative bilaterally  Slump test: Negative bilaterally    Motor Exam:    Strength: Rate on 1-5 scale Right Left    L1/2- hip flexion 5 5    L3- knee extension 5 5    L4- ankle dorsiflexion 5 5    L5- great toe extension 5 5    S1- ankle plantarflexion 5 5    Sensory Exam: Full and equal sensation to light touch throughout.    Neurologic: Cranial Nerves II-XII are grossly intact.   Clonus -negative bilaterally  Psychiatric: Cooperative.   Gait: Normal   Assistive Devices: None    Imaging Studies:   Results for orders placed during the hospital encounter of 11/18/22    MRI Lumbar Spine Without Contrast    Narrative  EXAMINATION: MRI LUMBAR SPINE WO CONTRAST-    CLINICAL INDICATION: M54.50; M54.50-Low back pain, unspecified    COMPARISON: None    TECHNIQUE: Multiplanar, multisequence MR imaging performed through the  lumbar spine WITHOUT contrast.    FINDINGS:    HARDWARE: No MRI evidence of hardware.    NUMBERING/ALIGNMENT:?  5 lumbar vertebral body segments.  Intact vertebral body alignment.    SPINAL CORD:?  Conus terminates at the?L1 level.    BONES:  No fracture. No marrow signal abnormality.    POSTERIOR  ELEMENTS:  Posterior elements are intact.    SOFT TISSUES:  The visualized paraspinal soft tissues are unremarkable.    T12-L1:  No disk bulge or protrusion.  No central canal or neuroforaminal  stenosis.    L1/2:  No disk bulge or protrusion.  No central canal or neuroforaminal  stenosis.    L2/3:  Mild annular disc bulge and small superimposed 2 mm central disc  protrusion. No stenosis. No annular tear.    L3/4:  Broad-based posterior disc bulge eccentric to left. Mild facet  arthropathy. Mild central canal and left neural foraminal stenosis.    L4/5:  Broad-based posterior disc bulge. Moderate facet arthropathy.  Mild-moderate central canal stenosis. Mild neural foraminal stenosis.    L5/S1:  No disk bulge or protrusion.  No central canal or neuroforaminal  stenosis.    OTHER:  No additional remarkable findings.    Impression  1. Mild multilevel degenerative disc disease and mild to moderate facet  arthropathy with mild-moderate central canal and neural foraminal  stenosis at the L3/4 and L4/5 levels.  2. No fracture or traumatic malalignment.    This report was finalized on 11/18/2022 2:23 PM by Dr. Parmjit Scales MD.      Impression & Plan:   3/2/2023: Fidelina Osman is a 48 y.o. female with past medical history significant for work fall in 2014 who presents to the pain clinic for evaluation and treatment of right-sided buttock pain.  I personally reviewed the patient's lumbar MRI dated 11/18/2022 which demonstrates moderate degenerative disc disease at L3/4 and L4/5 with Modic 1 endplate changes at L3/4; no significant canal or neuroforaminal stenosis.  Clinical examination most consistent with right sacroiliac joint pain versus greater trochanteric bursitis.  Given significant benefit from prior SIJ injections reasonable to repeat diagnostic and therapeutic right sacroiliac joint injection.  If short-term relief, will proceed with second diagnostic SIJ injection and can consider right sacroiliac joint fusion.   If no benefit whatsoever can consider right L4/5 transforaminal epidural steroid injection, SCS trial.  4/24/2023: Excellent relief with right SIJ.  Discussed repeat every 3 to 4 months versus fusion.  7/26/2023: Return of right SIJ pain.  We will schedule repeat right SIJ injection.    1. Sacroiliitis          PLAN:  1. Medication Recommendations: Recommend Voltaren topical, NSAIDs, Tylenol.  Can trial turmeric 500 mg twice daily if NSAID contraindicated.    2. Physical Therapy: Continue HEP    3. Psychological: defer    4. Complementary and alternative (CAM) Therapies:     5. Labs: None indicated     6. Imaging: MRI reviewed with patient utilizing 3D model to explain pathology    7. Interventions: Schedule repeat right sacroiliac joint injection (CPT 02584)    8. Referrals: None indicated     9. Records requested: n/a    10. Lifestyle goals:    Follow-up 3 months      [facility]  Darwin Riley MD

## 2023-08-23 ENCOUNTER — TELEPHONE (OUTPATIENT)
Dept: PAIN MEDICINE | Facility: CLINIC | Age: 48
End: 2023-08-23
Payer: COMMERCIAL

## 2023-08-23 NOTE — TELEPHONE ENCOUNTER
Called patient and left her a VM with her appointment time, date and location with Dr. Riley on September 28. Advised her to call the office if she has any questions or needs to reschedule.

## 2023-09-28 ENCOUNTER — OUTSIDE FACILITY SERVICE (OUTPATIENT)
Dept: PAIN MEDICINE | Facility: CLINIC | Age: 48
End: 2023-09-28
Payer: COMMERCIAL

## 2023-09-28 ENCOUNTER — DOCUMENTATION (OUTPATIENT)
Dept: PAIN MEDICINE | Facility: CLINIC | Age: 48
End: 2023-09-28

## 2023-09-28 NOTE — PROGRESS NOTES
[facility]  [address] [place] [id number]      Sacroiliac Joint Injection    PROCEDURE: Fluoroscopically-Guided Sacroiliac Joint Injection -right-      PRE-OP DIAGNOSIS: Sacroiliac joint pain  POST-OP DIAGNOSIS: Same    BLOOD THINNERS (ANTIPLATELETS/ANTICOAGULANTS): Were discussed with the patient and ALISSA Guidelines were followed.    CONSENT: Risks, benefits and options were explained to the patient, all questions were answered and written informed consent was obtained.     ANESTHESIA:  Local only     PROCEDURE NOTE: The patient was placed prone with the abdomen supported by a pillow and all pressure points were padded. Standard ASA monitors were applied. A timeout protocol was performed. Proper protective gear was worn by the physician including a mask, scrub cap, and sterile gloves. The patient's low back and sacral region were prepped with chlorhexidine and draped in a sterile fashion. Fluoroscopic guidance was used to identify the inferior and posterior opening to the right sacroiliac joint. Under intermittent fluoroscopic guidance, the tip of a 25-gauge, 3.5-inch spinal needle with bent tip was advanced toward the inferior aspect of the sacroiliac joint opening and advanced slightly into the joint space. Appropriate needle tip position was confirmed in the AP and lateral view. After negative aspiration, a total of 0.5 mL of Omnipaque was injected with an acceptable arthrogram outlining the sacroiliac joint and no vascular uptake. Next, a mixture containing 40mg methylprednisolone with 2cc lidocaine 1% for a total volume of 3 ml was injected without any complications. The needles were removed and sterile bandages applied at the needle sites. The patient was transferred to the stretcher and taken to recovery in stable condition.    EBL: None  COMPLICATIONS: None    The patient tolerated the procedure well. Vital signs were stable. Sensory and motor exam was unchanged from  baseline. The patient was observed in recovery and was discharged after meeting established criteria.    FOLLOW UP: as scheduled   ADDITIONAL NOTES: [n/a]    [facility]  Darwin Riley MD     Codes:  [id number]

## 2023-12-28 ENCOUNTER — TELEPHONE (OUTPATIENT)
Dept: PAIN MEDICINE | Facility: CLINIC | Age: 48
End: 2023-12-28
Payer: COMMERCIAL

## 2023-12-28 NOTE — TELEPHONE ENCOUNTER
Left VM for patient advising her that her follow up visit with Dr Riley on 3/27/2024 has been moved to 3/26/2024 at 1:00 pm with BRAD Paula and advised her to call the office if she has any questions.

## 2024-04-01 ENCOUNTER — OFFICE VISIT (OUTPATIENT)
Dept: PAIN MEDICINE | Facility: CLINIC | Age: 49
End: 2024-04-01
Payer: COMMERCIAL

## 2024-04-01 VITALS — HEIGHT: 69 IN | BODY MASS INDEX: 26.79 KG/M2 | WEIGHT: 180.9 LBS

## 2024-04-01 DIAGNOSIS — M46.1 SACROILIITIS: Primary | ICD-10-CM

## 2024-04-01 PROCEDURE — 99213 OFFICE O/P EST LOW 20 MIN: CPT

## 2024-04-01 RX ORDER — PANTOPRAZOLE SODIUM 40 MG/1
TABLET, DELAYED RELEASE ORAL
COMMUNITY
Start: 2024-01-03

## 2024-04-01 RX ORDER — BROMPHENIRAMINE MALEATE, PSEUDOEPHEDRINE HYDROCHLORIDE, AND DEXTROMETHORPHAN HYDROBROMIDE 2; 30; 10 MG/5ML; MG/5ML; MG/5ML
SYRUP ORAL
COMMUNITY
Start: 2024-03-28

## 2024-04-01 RX ORDER — PREGABALIN 100 MG/1
1 CAPSULE ORAL EVERY 12 HOURS SCHEDULED
COMMUNITY
Start: 2023-12-04

## 2024-04-01 RX ORDER — FLUCONAZOLE 200 MG/1
TABLET ORAL
COMMUNITY
Start: 2024-03-28

## 2024-04-01 RX ORDER — METHOCARBAMOL 750 MG/1
750 TABLET, FILM COATED ORAL 3 TIMES DAILY
COMMUNITY
Start: 2024-03-04

## 2024-04-01 NOTE — PROGRESS NOTES
Primary Physician: Liz Daugherty APRN    CHIEF COMPLAINT or REASON FOR VISIT: Follow-up (6 month f/u/Patient feels that her symptoms are returning. ) and Sacroiliitis      Initial HPI 3-2-2023:  Ms. Fidelina Osman is 49 y.o. female who presents as a new patient referral for evaluation treatment of chronic right-sided low back pain with radiation of the right hip and thigh.  Patient states that she has had this issue for approximately 10 years since falling onto her right-sided buttock area in 2014.  This is an old Worker's Comp. injury, patient currently pursuing disability.  She describes a lifelong history of ligamentous laxity including joint hypermobility and pelvic shifting.  She has completed physical therapy and engages routinely in chiropractic manipulation.  She was told by her physical therapist that she has a leg length discrepancy, recently started using a left shoe spacer.  Her sister also has the same ligamentous laxity.    She reports chronic achy pain at the left buttock radiating into the right posterior thigh terminating above the knee.  She denies numbness or tingling.  She does have to sit leaning away from her right side.  Around 2014 she underwent 6 interventional pain injections including lumbar epidural steroid injections.  She states the epidural injections only helped for about 1 week.  She subsequently had right-sided sacroiliac joint injections which provided over a month of relief.    She did see consultation with neurosurgery, Dr. Israel Moralez, who recommended conservative management including diagnostic injections to determine etiology of her pain.    Interval history: Patient returns to clinic after undergoing repeat right SI joint injection in September.  She reports good relief from this procedure for approximately 3-4 months.  Pain has returned without any inciting event or trauma.  Today she continues to complain of chronic right-sided low back pain with radiation to the  right hip and thigh.  We discussed a variety of treatment options today including repeat right SIJ versus SI joint fusion.  I provided her a pamphlet on SI joint fusion.  She is a little concerned about fusion given that she has a history of ligamentous laxity.  She would like to delay fusion as long as possible.    Interventions:    3/21/2023: Right SIJ injection with 100% relief for 3 weeks, now 75% relief 2 to 3 months  2023: Repeat right SIJ with 75% relief for 3-4 months    Objective Pain Scoring:   BRIEF PAIN INVENTORY:  Total score:   Pain Score    24 1501   PainSc:   7   PainLoc: Back  Comment: Lower back and right hip      PHQ-2: PHQ-2 Total Score: 3  PHQ-9: PHQ-9: Brief Depression Severity Measure Score: 6  Opioid Risk Tool:         Review of Systems:   ROS negative except as otherwise noted     Past Medical History:   Past Medical History:   Diagnosis Date    Asthma     Low back pain     Lumbosacral disc disease          Past Surgical History:   Past Surgical History:   Procedure Laterality Date     SECTION      HYSTEROSCOPY ENDOMETRIAL ABLATION           Family History   Family History   Problem Relation Age of Onset    Asthma Mother     Arthritis Mother     Breast cancer Neg Hx          Social History   Social History     Socioeconomic History    Marital status: Single   Tobacco Use    Smoking status: Former     Types: Cigarettes   Vaping Use    Vaping status: Never Used   Substance and Sexual Activity    Alcohol use: Never    Drug use: Never    Sexual activity: Defer        Medications:     Current Outpatient Medications:     albuterol sulfate  (90 Base) MCG/ACT inhaler, Ventolin HFA 90 mcg/actuation aerosol inhaler  INHALE 2 PUFFS BY MOUTH EVERY 4 HOURS AS NEEDED, Disp: , Rfl:     Anefrin Spray 0.05 % nasal spray, USE 3 SPRAYS IN EACH NOSTRIL EVERY 12 HOURS FOR 5 DAYS AS NEEDED FOR NASAL CONGESTION, Disp: , Rfl:     baclofen (LIORESAL) 10 MG tablet, Take 1 tablet by mouth 2  "(Two) Times a Day., Disp: , Rfl:     brompheniramine-pseudoephedrine-DM 30-2-10 MG/5ML syrup, TAKE 10 ML BY MOUTH EVERY 8 HOURS FOR 5 DAYS FOR COUGH, Disp: , Rfl:     buPROPion (WELLBUTRIN) 75 MG tablet, Take 1 tablet by mouth Daily., Disp: , Rfl:     Dulera 200-5 MCG/ACT inhaler, Inhale 2 puffs 2 (Two) Times a Day., Disp: , Rfl:     fluconazole (DIFLUCAN) 200 MG tablet, TAKE 1 TABLET BY MOUTH DAILY FOR 2 DAYS AS NEEDED AFTER COMPLETION OF ANTIBIOTICS, Disp: , Rfl:     gabapentin (NEURONTIN) 600 MG tablet, Take 1 tablet by mouth 3 (Three) Times a Day., Disp: , Rfl:     loratadine (CLARITIN) 10 MG tablet, Take 1 tablet by mouth Every Morning., Disp: , Rfl:     methocarbamol (ROBAXIN) 750 MG tablet, Take 1 tablet by mouth 3 (Three) Times a Day., Disp: , Rfl:     montelukast (SINGULAIR) 10 MG tablet, Take 1 tablet by mouth every night at bedtime., Disp: , Rfl:     pantoprazole (PROTONIX) 40 MG EC tablet, take 1 tablet by mouth every day in the morning for 30 days, Disp: , Rfl:     pregabalin (LYRICA) 100 MG capsule, Take 1 capsule by mouth Every 12 (Twelve) Hours., Disp: , Rfl:         Physical Exam:     Vitals:    04/01/24 1501   Weight: 82.1 kg (180 lb 14.4 oz)   Height: 175.3 cm (69.02\")   PainSc:   7   PainLoc: Back  Comment: Lower back and right hip        General: Alert and oriented, No acute distress.   HEENT: Normocephalic, atraumatic.   Cardiovascular: No gross edema  Respiratory: Respirations are non-labored    Thoracic Spine:   Inspection: no gross abnormality  Paraspinal muscle palpation: nontender  Spinous process palpation: nontender    Lumbar Spine:   No masses or atrophy  Range of motion - Flexion normal. Extension normal.   Facet Loading: Negative bilaterally  Facet Palpation - Nontender   PSIS tenderness -positive bilaterally right greater than left  Tino's/GREG/Thigh thrust -positive bilaterally right greater than left  Straight leg raise: Negative bilaterally  Slump test: Negative " bilaterally    Motor Exam:    Strength: Rate on 1-5 scale Right Left    L1/2- hip flexion 5 5    L3- knee extension 5 5    L4- ankle dorsiflexion 5 5    L5- great toe extension 5 5    S1- ankle plantarflexion 5 5    Sensory Exam: Full and equal sensation to light touch throughout.    Neurologic: Cranial Nerves II-XII are grossly intact.   Clonus -negative bilaterally  Psychiatric: Cooperative.   Gait: Normal   Assistive Devices: None    Physical exam is consistent and accurate as of 4/1/2024    Imaging Studies:   Results for orders placed during the hospital encounter of 11/18/22    MRI Lumbar Spine Without Contrast    Narrative  EXAMINATION: MRI LUMBAR SPINE WO CONTRAST-    CLINICAL INDICATION: M54.50; M54.50-Low back pain, unspecified    COMPARISON: None    TECHNIQUE: Multiplanar, multisequence MR imaging performed through the  lumbar spine WITHOUT contrast.    FINDINGS:    HARDWARE: No MRI evidence of hardware.    NUMBERING/ALIGNMENT:?  5 lumbar vertebral body segments.  Intact vertebral body alignment.    SPINAL CORD:?  Conus terminates at the?L1 level.    BONES:  No fracture. No marrow signal abnormality.    POSTERIOR ELEMENTS:  Posterior elements are intact.    SOFT TISSUES:  The visualized paraspinal soft tissues are unremarkable.    T12-L1:  No disk bulge or protrusion.  No central canal or neuroforaminal  stenosis.    L1/2:  No disk bulge or protrusion.  No central canal or neuroforaminal  stenosis.    L2/3:  Mild annular disc bulge and small superimposed 2 mm central disc  protrusion. No stenosis. No annular tear.    L3/4:  Broad-based posterior disc bulge eccentric to left. Mild facet  arthropathy. Mild central canal and left neural foraminal stenosis.    L4/5:  Broad-based posterior disc bulge. Moderate facet arthropathy.  Mild-moderate central canal stenosis. Mild neural foraminal stenosis.    L5/S1:  No disk bulge or protrusion.  No central canal or neuroforaminal  stenosis.    OTHER:  No additional  remarkable findings.    Impression  1. Mild multilevel degenerative disc disease and mild to moderate facet  arthropathy with mild-moderate central canal and neural foraminal  stenosis at the L3/4 and L4/5 levels.  2. No fracture or traumatic malalignment.    This report was finalized on 2022 2:23 PM by Dr. Parmjit Scales MD.      Impression & Plan:   3/2/2023: Fidelina Osman is a 49 y.o. female with past medical history significant for work fall in  who presents to the pain clinic for evaluation and treatment of right-sided buttock pain.  I personally reviewed the patient's lumbar MRI dated 2022 which demonstrates moderate degenerative disc disease at L3/4 and L4/5 with Modic 1 endplate changes at L3/4; no significant canal or neuroforaminal stenosis.  Clinical examination most consistent with right sacroiliac joint pain versus greater trochanteric bursitis.  Given significant benefit from prior SIJ injections reasonable to repeat diagnostic and therapeutic right sacroiliac joint injection.  If short-term relief, will proceed with second diagnostic SIJ injection and can consider right sacroiliac joint fusion.  If no benefit whatsoever can consider right L4/5 transforaminal epidural steroid injection, SCS trial.  2023: Excellent relief with right SIJ.  Discussed repeat every 3 to 4 months versus fusion.  2023: Return of right SIJ pain.  We will schedule repeat right SIJ injection.  2024: Good relief from repeat right SIJ.  Will schedule repeat injection.  Can consider right SI joint fusion with PainTeq    1. Sacroiliitis            PLAN:  1. Medication Recommendations: Recommend Voltaren topical, NSAIDs, Tylenol.  Can trial turmeric 500 mg twice daily if NSAID contraindicated.    2. Physical Therapy: Continue HEP    3. Psychological: defer    4. Complementary and alternative (CAM) Therapies:     5. Labs: None indicated     6. Imagin. Interventions: Schedule repeat right  sacroiliac joint injection (CPT 04110).  Can consider SI joint fusion    8. Referrals: None indicated     9. Records requested: n/a    10. Lifestyle goals:    Follow-up 4 months after injection      [facility]  Gauri Escalona PA-C    Quality metrics:  Fidelina Osman reports a pain score of 7.  Given her pain assessment as noted, treatment options were discussed and the following options were decided upon as a follow-up plan to address the patient's pain: continuation of current treatment plan for pain.

## 2024-05-02 ENCOUNTER — OUTSIDE FACILITY SERVICE (OUTPATIENT)
Dept: PAIN MEDICINE | Facility: CLINIC | Age: 49
End: 2024-05-02
Payer: COMMERCIAL

## 2024-05-02 ENCOUNTER — DOCUMENTATION (OUTPATIENT)
Dept: PAIN MEDICINE | Facility: CLINIC | Age: 49
End: 2024-05-02

## 2024-05-02 NOTE — PROGRESS NOTES
[facility]  [address] [place] [address]    PROCEDURE: Fluoroscopically-Guided Sacroiliac Joint Injection -right-      PRE-OP DIAGNOSIS: Sacroiliac joint pain  POST-OP DIAGNOSIS: Same    BLOOD THINNERS (ANTIPLATELETS/ANTICOAGULANTS): Were discussed with the patient and ALISSA Guidelines were followed.    CONSENT: Risks, benefits and options were explained to the patient, all questions were answered and written informed consent was obtained.     ANESTHESIA: Moderate sedation was required to maintain comfort, safety, and cooperation during the procedure.  The duration of sedation service was over 10 minutes.  Patient received 1 mg IV Versed and 50 mcg IV fentanyl.  Independent observation and monitoring was performed by Nini Rao.  The patient's level of consciousness and physiologic status was continually monitored with pulse oximetry, EKG from 1010 to 1024.  There were no complications or adverse events during sedation.  After the sedation patient was taken to the recovery area.    PROCEDURE NOTE: The patient was placed prone with the abdomen supported by a pillow and all pressure points were padded. Standard ASA monitors were applied. A timeout protocol was performed. Proper protective gear was worn by the physician including a mask, scrub cap, and sterile gloves. The patient's low back and sacral region were prepped with chlorhexidine and draped in a sterile fashion. Fluoroscopic guidance was used to identify the inferior and posterior opening to the right sacroiliac joint. Under intermittent fluoroscopic guidance, the tip of a 25-gauge, 3.5-inch spinal needle with bent tip was advanced toward the inferior aspect of the sacroiliac joint opening and advanced slightly into the joint space. Appropriate needle tip position was confirmed in the AP and lateral view. After negative aspiration, a total of 0.5 mL of Omnipaque was injected with an acceptable arthrogram  outlining the sacroiliac joint and no vascular uptake. Next, a mixture containing 40mg methylprednisolone with 2cc lidocaine 1% for a total volume of 3 ml was injected without any complications. The needles were removed and sterile bandages applied at the needle sites. The patient was transferred to the stretcher and taken to recovery in stable condition.    EBL: None  COMPLICATIONS: None    The patient tolerated the procedure well. Vital signs were stable. Sensory and motor exam was unchanged from baseline. The patient was observed in recovery and was discharged after meeting established criteria.    FOLLOW UP: as scheduled   ADDITIONAL NOTES: [n/a]    [facility]  Darwin Riley MD     Codes:  [id number]  [phone]

## 2024-05-17 ENCOUNTER — OFFICE VISIT (OUTPATIENT)
Dept: GASTROENTEROLOGY | Facility: CLINIC | Age: 49
End: 2024-05-17
Payer: COMMERCIAL

## 2024-05-17 VITALS
SYSTOLIC BLOOD PRESSURE: 110 MMHG | WEIGHT: 182 LBS | DIASTOLIC BLOOD PRESSURE: 77 MMHG | BODY MASS INDEX: 26.96 KG/M2 | HEART RATE: 92 BPM | HEIGHT: 69 IN

## 2024-05-17 DIAGNOSIS — Z80.0 FAMILY HISTORY OF COLON CANCER: ICD-10-CM

## 2024-05-17 DIAGNOSIS — K21.9 GASTROESOPHAGEAL REFLUX DISEASE, UNSPECIFIED WHETHER ESOPHAGITIS PRESENT: Primary | ICD-10-CM

## 2024-05-17 DIAGNOSIS — Z12.11 ENCOUNTER FOR SCREENING FOR MALIGNANT NEOPLASM OF COLON: ICD-10-CM

## 2024-05-17 DIAGNOSIS — R13.19 ESOPHAGEAL DYSPHAGIA: ICD-10-CM

## 2024-05-17 DIAGNOSIS — Z83.719 FAMILY HISTORY OF POLYPS IN THE COLON: ICD-10-CM

## 2024-05-17 RX ORDER — LANSOPRAZOLE 30 MG/1
30 CAPSULE, DELAYED RELEASE ORAL 2 TIMES DAILY
Qty: 60 CAPSULE | Refills: 5 | Status: SHIPPED | OUTPATIENT
Start: 2024-05-17

## 2024-05-17 RX ORDER — BISACODYL 5 MG/1
20 TABLET, DELAYED RELEASE ORAL ONCE
Qty: 4 TABLET | Refills: 0 | Status: SHIPPED | OUTPATIENT
Start: 2024-05-17 | End: 2024-05-17

## 2024-05-17 RX ORDER — SODIUM, POTASSIUM,MAG SULFATES 17.5-3.13G
1 SOLUTION, RECONSTITUTED, ORAL ORAL TAKE AS DIRECTED
Qty: 354 ML | Refills: 0 | Status: SHIPPED | OUTPATIENT
Start: 2024-05-17

## 2024-05-17 NOTE — PROGRESS NOTES
DATE OF CONSULTATION:  2024    REASON FOR REFERRAL: GERD    REFERRING PHYSICIAN:  ENRIQUE Bowser    CHIEF COMPLAINT:  Poorly controlled GERD, Dysphagia     HISTORY OF PRESENT ILLNESS:   Fidelina Osman is a very pleasant 49 y.o. female who is being seen today at the request of ENRIQUE Bowser for evaluation and treatment of GERD. Ms. Garcia reports having difficulty with GERD for the past ~2 years.  She has previously tried omeprazole and Protonix without improvement.  She has recently been taking samples of a medication which she takes once daily provided by her PCP which has been helpful.  However, she is unsure of the name of this medication.  At present, she reports having daily flares with burning in her throat/chest and regurgitation.  She reports her flares are worse in the evenings.  She denies having epigastric pain.  She complains of dysphagia particularly with solids (breads/meats).  She feels like food will get stuck in her upper esophagus.  In the past, she has gotten choked on pizza and hamburger meat.  Of note, she retains her gallbladder.  She denies nausea or vomiting.  She reports all types of foods cause exacerbation of her symptoms.  She admits to drinking 1 red bull every morning and 2 cans of soda each day.  Her weight has been stable.  She denies smoking.  She reports her bowels move well.  She denies melena or bright red bleeding per rectum.  She has never had a screening colonoscopy.  She reports family history of colon cancer in her maternal grandmother.  She denies any first-degree relatives with colon cancer.  She reports her mother has history of colon polyps.  She has no other complaints today.    PAST MEDICAL HISTORY:  Past Medical History:   Diagnosis Date    Asthma     Low back pain     Lumbosacral disc disease        PAST SURGICAL HISTORY:  Past Surgical History:   Procedure Laterality Date     SECTION      HYSTEROSCOPY ENDOMETRIAL ABLATION          FAMILY HISTORY:  Family History   Problem Relation Age of Onset    Asthma Mother     Arthritis Mother     Breast cancer Neg Hx        SOCIAL HISTORY:  Social History     Socioeconomic History    Marital status: Single   Tobacco Use    Smoking status: Former     Types: Cigarettes   Vaping Use    Vaping status: Never Used   Substance and Sexual Activity    Alcohol use: Never    Drug use: Never    Sexual activity: Defer     MEDICATIONS:  The current medication list was reviewed in the EMR    Current Outpatient Medications:     albuterol sulfate  (90 Base) MCG/ACT inhaler, Ventolin HFA 90 mcg/actuation aerosol inhaler  INHALE 2 PUFFS BY MOUTH EVERY 4 HOURS AS NEEDED, Disp: , Rfl:     Anefrin Spray 0.05 % nasal spray, USE 3 SPRAYS IN EACH NOSTRIL EVERY 12 HOURS FOR 5 DAYS AS NEEDED FOR NASAL CONGESTION, Disp: , Rfl:     baclofen (LIORESAL) 10 MG tablet, Take 1 tablet by mouth 2 (Two) Times a Day., Disp: , Rfl:     brompheniramine-pseudoephedrine-DM 30-2-10 MG/5ML syrup, TAKE 10 ML BY MOUTH EVERY 8 HOURS FOR 5 DAYS FOR COUGH, Disp: , Rfl:     buPROPion (WELLBUTRIN) 75 MG tablet, Take 1 tablet by mouth Daily., Disp: , Rfl:     Dulera 200-5 MCG/ACT inhaler, Inhale 2 puffs 2 (Two) Times a Day., Disp: , Rfl:     fluconazole (DIFLUCAN) 200 MG tablet, TAKE 1 TABLET BY MOUTH DAILY FOR 2 DAYS AS NEEDED AFTER COMPLETION OF ANTIBIOTICS, Disp: , Rfl:     gabapentin (NEURONTIN) 600 MG tablet, Take 1 tablet by mouth 3 (Three) Times a Day., Disp: , Rfl:     loratadine (CLARITIN) 10 MG tablet, Take 1 tablet by mouth Every Morning., Disp: , Rfl:     methocarbamol (ROBAXIN) 750 MG tablet, Take 1 tablet by mouth 3 (Three) Times a Day., Disp: , Rfl:     montelukast (SINGULAIR) 10 MG tablet, Take 1 tablet by mouth every night at bedtime., Disp: , Rfl:     pantoprazole (PROTONIX) 40 MG EC tablet, take 1 tablet by mouth every day in the morning for 30 days, Disp: , Rfl:     pregabalin (LYRICA) 100 MG capsule, Take 1 capsule by  "mouth Every 12 (Twelve) Hours., Disp: , Rfl:     ALLERGIES:  No Known Allergies      REVIEW OF SYSTEMS:    A comprehensive 14 point review of systems was performed.  Significant findings as mentioned above.  All other systems reviewed and are negative.        Physical Exam   Vital Signs: /77 (BP Location: Left arm, Patient Position: Sitting, Cuff Size: Large Adult)   Pulse 92   Ht 175.3 cm (69.02\")   Wt 82.6 kg (182 lb)   BMI 26.86 kg/m²    General: Well developed, well nourished, alert and oriented x 3, in no acute distress.   Head: ATNC   Eyes: PERRL, No evidence of conjunctivitis.   Nose: No nasal discharge.   Mouth: Oral mucosal membranes moist. No oral ulceration or hemorrhages.   Neck: Neck supple. No thyromegaly. No JVD.   Lungs: Clear in all fields to A&P without rales, rhonchi or wheezing.   Heart: . Regular rate and rhythm. No murmurs, rubs, or gallops.   Abdomen: Soft. Bowel sounds are normoactive. Nontender with palpation.   Extremities: No cyanosis or edema.   Neurologic: Grossly non-focal exam      ASSESSMENT & PLAN:  Fidelina Osman is a very pleasant 49 y.o. female with    1.  GERD:  2.  Dysphagia (solids):  -Recommended EGD to evaluate the above chronic/worsening issues.  We discussed risks/benefits and patient is agreeable to proceed.  Will schedule.  In the interim, will start lansoprazole 30 mg p.o. twice daily.  She has previously taken Protonix and omeprazole without improvement.  -We discussed lifestyle changes including weight loss and important dietary modifications, limiting triggers such as caffeine, chocolate, spicy foods, acidic foods, fried/greasy foods, food with high fat content and carbonated beverages.  -Patient will return to clinic following EGD.    3.  Screening colonoscopy:  4.  Family history of colon polyps (mother):  5.  Family history of colon cancer (maternal grandmother):  -We discussed risks/benefits of colonoscopy and patient is agreeable.  Will " schedule.      The patient was in agreement with the plan and all questions were answered to her satisfaction.     Thank you so much for allowing us to participate in the care of Fidelina Osman . Please do not hesitate to contact us with any questions or concerns.             Electronically Signed by: ENRIQUE Maza , May 17, 2024 11:05 EDT       CC:   ENRIQUE Bowser, ENRIQUE Bay

## 2024-05-21 PROBLEM — Z12.11 ENCOUNTER FOR SCREENING FOR MALIGNANT NEOPLASM OF COLON: Status: ACTIVE | Noted: 2024-05-17

## 2024-05-21 PROBLEM — K21.9 GASTROESOPHAGEAL REFLUX DISEASE: Status: ACTIVE | Noted: 2024-05-17

## 2024-05-21 PROBLEM — R13.19 ESOPHAGEAL DYSPHAGIA: Status: ACTIVE | Noted: 2024-05-17

## 2024-06-05 ENCOUNTER — HOSPITAL ENCOUNTER (OUTPATIENT)
Facility: HOSPITAL | Age: 49
Setting detail: HOSPITAL OUTPATIENT SURGERY
Discharge: HOME | End: 2024-06-05
Attending: INTERNAL MEDICINE | Admitting: INTERNAL MEDICINE
Payer: COMMERCIAL

## 2024-06-05 ENCOUNTER — ANESTHESIA EVENT (OUTPATIENT)
Dept: PERIOP | Facility: HOSPITAL | Age: 49
End: 2024-06-05
Payer: COMMERCIAL

## 2024-06-05 ENCOUNTER — ANESTHESIA (OUTPATIENT)
Dept: PERIOP | Facility: HOSPITAL | Age: 49
End: 2024-06-05
Payer: COMMERCIAL

## 2024-06-05 VITALS
SYSTOLIC BLOOD PRESSURE: 110 MMHG | HEART RATE: 79 BPM | OXYGEN SATURATION: 97 % | WEIGHT: 180 LBS | TEMPERATURE: 97.5 F | RESPIRATION RATE: 15 BRPM | DIASTOLIC BLOOD PRESSURE: 56 MMHG | BODY MASS INDEX: 26.66 KG/M2 | HEIGHT: 69 IN

## 2024-06-05 DIAGNOSIS — R13.19 ESOPHAGEAL DYSPHAGIA: ICD-10-CM

## 2024-06-05 DIAGNOSIS — Z12.11 ENCOUNTER FOR SCREENING FOR MALIGNANT NEOPLASM OF COLON: ICD-10-CM

## 2024-06-05 DIAGNOSIS — K21.9 GASTROESOPHAGEAL REFLUX DISEASE, UNSPECIFIED WHETHER ESOPHAGITIS PRESENT: ICD-10-CM

## 2024-06-05 LAB
B-HCG UR QL: NEGATIVE
EXPIRATION DATE: NORMAL
INTERNAL NEGATIVE CONTROL: NEGATIVE
INTERNAL POSITIVE CONTROL: POSITIVE
Lab: NORMAL

## 2024-06-05 PROCEDURE — 25810000003 LACTATED RINGERS PER 1000 ML: Performed by: ANESTHESIOLOGY

## 2024-06-05 PROCEDURE — 43239 EGD BIOPSY SINGLE/MULTIPLE: CPT | Performed by: INTERNAL MEDICINE

## 2024-06-05 PROCEDURE — 25010000002 PROPOFOL 200 MG/20ML EMULSION: Performed by: NURSE ANESTHETIST, CERTIFIED REGISTERED

## 2024-06-05 PROCEDURE — 25010000002 FENTANYL CITRATE (PF) 50 MCG/ML SOLUTION: Performed by: NURSE ANESTHETIST, CERTIFIED REGISTERED

## 2024-06-05 PROCEDURE — 45380 COLONOSCOPY AND BIOPSY: CPT | Performed by: INTERNAL MEDICINE

## 2024-06-05 PROCEDURE — 81025 URINE PREGNANCY TEST: CPT | Performed by: INTERNAL MEDICINE

## 2024-06-05 RX ORDER — FENTANYL CITRATE 50 UG/ML
50 INJECTION, SOLUTION INTRAMUSCULAR; INTRAVENOUS
Status: DISCONTINUED | OUTPATIENT
Start: 2024-06-05 | End: 2024-06-05 | Stop reason: HOSPADM

## 2024-06-05 RX ORDER — SODIUM CHLORIDE 0.9 % (FLUSH) 0.9 %
10 SYRINGE (ML) INJECTION AS NEEDED
Status: DISCONTINUED | OUTPATIENT
Start: 2024-06-05 | End: 2024-06-05 | Stop reason: HOSPADM

## 2024-06-05 RX ORDER — SODIUM CHLORIDE, SODIUM LACTATE, POTASSIUM CHLORIDE, CALCIUM CHLORIDE 600; 310; 30; 20 MG/100ML; MG/100ML; MG/100ML; MG/100ML
100 INJECTION, SOLUTION INTRAVENOUS ONCE AS NEEDED
Status: DISCONTINUED | OUTPATIENT
Start: 2024-06-05 | End: 2024-06-05 | Stop reason: HOSPADM

## 2024-06-05 RX ORDER — PROPOFOL 10 MG/ML
INJECTION, EMULSION INTRAVENOUS AS NEEDED
Status: DISCONTINUED | OUTPATIENT
Start: 2024-06-05 | End: 2024-06-05 | Stop reason: SURG

## 2024-06-05 RX ORDER — MEPERIDINE HYDROCHLORIDE 25 MG/ML
12.5 INJECTION INTRAMUSCULAR; INTRAVENOUS; SUBCUTANEOUS
Status: DISCONTINUED | OUTPATIENT
Start: 2024-06-05 | End: 2024-06-05 | Stop reason: HOSPADM

## 2024-06-05 RX ORDER — IPRATROPIUM BROMIDE AND ALBUTEROL SULFATE 2.5; .5 MG/3ML; MG/3ML
3 SOLUTION RESPIRATORY (INHALATION) ONCE AS NEEDED
Status: DISCONTINUED | OUTPATIENT
Start: 2024-06-05 | End: 2024-06-05 | Stop reason: HOSPADM

## 2024-06-05 RX ORDER — MIDAZOLAM HYDROCHLORIDE 1 MG/ML
1 INJECTION INTRAMUSCULAR; INTRAVENOUS
Status: DISCONTINUED | OUTPATIENT
Start: 2024-06-05 | End: 2024-06-05 | Stop reason: HOSPADM

## 2024-06-05 RX ORDER — PHENYLEPHRINE HCL IN 0.9% NACL 1 MG/10 ML
SYRINGE (ML) INTRAVENOUS AS NEEDED
Status: DISCONTINUED | OUTPATIENT
Start: 2024-06-05 | End: 2024-06-05 | Stop reason: SURG

## 2024-06-05 RX ORDER — KETOROLAC TROMETHAMINE 30 MG/ML
30 INJECTION, SOLUTION INTRAMUSCULAR; INTRAVENOUS EVERY 6 HOURS PRN
Status: DISCONTINUED | OUTPATIENT
Start: 2024-06-05 | End: 2024-06-05 | Stop reason: HOSPADM

## 2024-06-05 RX ORDER — FENTANYL CITRATE 50 UG/ML
INJECTION, SOLUTION INTRAMUSCULAR; INTRAVENOUS AS NEEDED
Status: DISCONTINUED | OUTPATIENT
Start: 2024-06-05 | End: 2024-06-05 | Stop reason: SURG

## 2024-06-05 RX ORDER — SODIUM CHLORIDE 9 MG/ML
40 INJECTION, SOLUTION INTRAVENOUS AS NEEDED
Status: DISCONTINUED | OUTPATIENT
Start: 2024-06-05 | End: 2024-06-05 | Stop reason: HOSPADM

## 2024-06-05 RX ORDER — SODIUM CHLORIDE 0.9 % (FLUSH) 0.9 %
10 SYRINGE (ML) INJECTION EVERY 12 HOURS SCHEDULED
Status: DISCONTINUED | OUTPATIENT
Start: 2024-06-05 | End: 2024-06-05 | Stop reason: HOSPADM

## 2024-06-05 RX ORDER — OXYCODONE HYDROCHLORIDE AND ACETAMINOPHEN 5; 325 MG/1; MG/1
1 TABLET ORAL ONCE AS NEEDED
Status: DISCONTINUED | OUTPATIENT
Start: 2024-06-05 | End: 2024-06-05 | Stop reason: HOSPADM

## 2024-06-05 RX ORDER — LIDOCAINE HYDROCHLORIDE 20 MG/ML
INJECTION, SOLUTION EPIDURAL; INFILTRATION; INTRACAUDAL; PERINEURAL AS NEEDED
Status: DISCONTINUED | OUTPATIENT
Start: 2024-06-05 | End: 2024-06-05 | Stop reason: SURG

## 2024-06-05 RX ORDER — SODIUM CHLORIDE, SODIUM LACTATE, POTASSIUM CHLORIDE, CALCIUM CHLORIDE 600; 310; 30; 20 MG/100ML; MG/100ML; MG/100ML; MG/100ML
125 INJECTION, SOLUTION INTRAVENOUS ONCE
Status: COMPLETED | OUTPATIENT
Start: 2024-06-05 | End: 2024-06-05

## 2024-06-05 RX ORDER — ONDANSETRON 2 MG/ML
4 INJECTION INTRAMUSCULAR; INTRAVENOUS AS NEEDED
Status: DISCONTINUED | OUTPATIENT
Start: 2024-06-05 | End: 2024-06-05 | Stop reason: HOSPADM

## 2024-06-05 RX ADMIN — SODIUM CHLORIDE, POTASSIUM CHLORIDE, SODIUM LACTATE AND CALCIUM CHLORIDE: 600; 310; 30; 20 INJECTION, SOLUTION INTRAVENOUS at 14:32

## 2024-06-05 RX ADMIN — PROPOFOL 100 MG: 10 INJECTION, EMULSION INTRAVENOUS at 14:47

## 2024-06-05 RX ADMIN — Medication 100 MCG: at 14:45

## 2024-06-05 RX ADMIN — LIDOCAINE HYDROCHLORIDE 100 MG: 20 INJECTION, SOLUTION EPIDURAL; INFILTRATION; INTRACAUDAL; PERINEURAL at 14:35

## 2024-06-05 RX ADMIN — Medication 100 MCG: at 14:59

## 2024-06-05 RX ADMIN — PROPOFOL 100 MG: 10 INJECTION, EMULSION INTRAVENOUS at 14:35

## 2024-06-05 RX ADMIN — PROPOFOL 50 MG: 10 INJECTION, EMULSION INTRAVENOUS at 14:41

## 2024-06-05 RX ADMIN — PROPOFOL 100 MG: 10 INJECTION, EMULSION INTRAVENOUS at 14:53

## 2024-06-05 RX ADMIN — FENTANYL CITRATE 100 MCG: 50 INJECTION INTRAMUSCULAR; INTRAVENOUS at 14:35

## 2024-06-05 RX ADMIN — PROPOFOL 100 MG: 10 INJECTION, EMULSION INTRAVENOUS at 14:58

## 2024-06-05 NOTE — ANESTHESIA POSTPROCEDURE EVALUATION
Patient: Fidelina Osman    Procedure Summary       Date: 06/05/24 Room / Location:  [facility]  /  [facility]    Anesthesia Start: 1432 Anesthesia Stop: 1500    Procedures:       ESOPHAGOGASTRODUODENOSCOPY WITH BIOPSY (Esophagus)      COLONOSCOPY FOR SCREENING Diagnosis:       Gastroesophageal reflux disease, unspecified whether esophagitis present      Encounter for screening for malignant neoplasm of colon      Esophageal dysphagia      (Gastroesophageal reflux disease, unspecified whether esophagitis present [K21.9])      (Encounter for screening for malignant neoplasm of colon [Z12.11])      (Esophageal dysphagia [R13.19])    Surgeons: Autumn Mckee MD Provider: Vikram Bob MD    Anesthesia Type: general ASA Status: 2            Anesthesia Type: general    Vitals  Vitals Value Taken Time   /56 06/05/24 1528   Temp 97.5 °F (36.4 °C) 06/05/24 1501   Pulse 79 06/05/24 1528   Resp 15 06/05/24 1528   SpO2 97 % 06/05/24 1528           Post Anesthesia Care and Evaluation    Patient location during evaluation: PHASE II  Patient participation: complete - patient participated  Level of consciousness: awake and alert  Pain score: 1  Pain management: adequate    Airway patency: patent  Anesthetic complications: No anesthetic complications  PONV Status: controlled  Cardiovascular status: acceptable  Respiratory status: acceptable  Hydration status: acceptable

## 2024-06-05 NOTE — ANESTHESIA PREPROCEDURE EVALUATION
Anesthesia Evaluation     Patient summary reviewed and Nursing notes reviewed   no history of anesthetic complications:   NPO Solid Status: > 8 hours  NPO Liquid Status: > 8 hours           Airway   Mallampati: II  TM distance: >3 FB  Neck ROM: full  No difficulty expected  Dental - normal exam     Pulmonary - normal exam    breath sounds clear to auscultation  (+) asthma,  Cardiovascular - negative cardio ROS and normal exam    Rhythm: regular  Rate: normal        Neuro/Psych  (+) psychiatric history Depression  GI/Hepatic/Renal/Endo    (+) GERD well controlled    Musculoskeletal     Abdominal  - normal exam   Substance History - negative use     OB/GYN negative ob/gyn ROS         Other   arthritis,                       Anesthesia Plan    ASA 2     general   total IV anesthesia  intravenous induction     Anesthetic plan, risks, benefits, and alternatives have been provided, discussed and informed consent has been obtained with: patient.        CODE STATUS:

## 2024-06-06 ENCOUNTER — TELEPHONE (OUTPATIENT)
Dept: GASTROENTEROLOGY | Facility: CLINIC | Age: 49
End: 2024-06-06
Payer: COMMERCIAL

## 2024-06-06 NOTE — TELEPHONE ENCOUNTER
Per Dr. Rice, Please arrange for esophagram for this patient secondary to dysphagia.  She is a patient of Sabi's thank you     I sent order for esophagram to scheduling.

## 2024-06-07 LAB — REF LAB TEST METHOD: NORMAL

## 2024-06-07 NOTE — PROGRESS NOTES
At the time of your recent upper endoscopy, biopsies were taken of the distal esophagus.  Biopsies revealed reflux esophagitis.  Biopsies of the mid esophagus was negative for eosinophilic esophagitis.  Please continue baclofen 10 mg twice a day before meals for suspected bile acid reflux.  You will also continue your lansoprazole twice daily.  During your colonoscopy, a polyp was removed from the colon.  The polyp was hyperplastic.  This is completely benign.  You will need repeat colonoscopy for screening purposes in 10 years.

## 2024-06-17 ENCOUNTER — HOSPITAL ENCOUNTER (OUTPATIENT)
Dept: GENERAL RADIOLOGY | Facility: HOSPITAL | Age: 49
Discharge: HOME | End: 2024-06-17
Admitting: INTERNAL MEDICINE
Payer: COMMERCIAL

## 2024-06-17 DIAGNOSIS — R13.19 ESOPHAGEAL DYSPHAGIA: ICD-10-CM

## 2024-06-17 PROCEDURE — 74220 X-RAY XM ESOPHAGUS 1CNTRST: CPT

## 2024-06-17 PROCEDURE — 74220 X-RAY XM ESOPHAGUS 1CNTRST: CPT | Performed by: RADIOLOGY

## 2024-07-16 NOTE — PROGRESS NOTES
DATE:  7/17/2024    REASON FOR FOLLOW UP: GERD, dysphagia    REFERRING PHYSICIAN:  ENRIQUE Bowser     CHIEF COMPLAINT:  Follow-up of EGD/colonoscopy    HISTORY OF PRESENT ILLNESS:   Fidelina Osman is a very pleasant 49 y.o. female who is being seen today at the request of ENRIQUE Bowser  for evaluation and treatment of GERD. Ms. Garcia reports having difficulty with GERD for the past ~2 years.  She has previously tried omeprazole and Protonix without improvement.  She has recently been taking samples of a medication which she takes once daily provided by her PCP which has been helpful.  However, she is unsure of the name of this medication.  At present, she reports having daily flares with burning in her throat/chest and regurgitation.  She reports her flares are worse in the evenings.  She denies having epigastric pain.  She complains of dysphagia particularly with solids (breads/meats).  She feels like food will get stuck in her upper esophagus.  In the past, she has gotten choked on pizza and hamburger meat.  Of note, she retains her gallbladder.  She denies nausea or vomiting.  She reports all types of foods cause exacerbation of her symptoms.  She admits to drinking 1 red bull every morning and 2 cans of soda each day.  Her weight has been stable.  She denies smoking.  She reports her bowels move well.  She denies melena or bright red bleeding per rectum.  She has never had a screening colonoscopy.  She reports family history of colon cancer in her maternal grandmother.  She denies any first-degree relatives with colon cancer.  She reports her mother has history of colon polyps.  She has no other complaints today.    INTERVAL HISTORY:  Ms. Osman presents today for follow-up. Since her last visit, she underwent EGD/colonoscopy with Dr. Rice on 6/5/2024 and noted esophageal mucosal changes suggestive of eosinophilic esophagitis.  Biopsies were taken of the distal esophagus which  revealed reflux esophagitis.  Biopsies of the midesophagus were negative for eosinophilic esophagitis.  Biopsies of the small bowel were negative for celiac disease. With colonoscopy, a polyp was removed which was hyperplastic.  Given patient's mother has history of several precancerous colon polyps as well as family history of colon cancer in her maternal grandmother, would recommend repeat colonoscopy in 5 years.  Subsequently, she underwent esophagram on 2024 for evaluation of dysphagia which revealed small hiatal hernia, otherwise unremarkable.  There was no evidence of stricture or dysmotility of the esophagus.  At her most recent visit, patient was started on lansoprazole 30 mg p.o. twice daily which she found very helpful.  However, she reports difficulty getting this refilled and resumed omeprazole 20 mg p.o. daily following EGD.  With omeprazole, she reports having daily flares which occur during the day and at night.  She would like to resume lansoprazole.  She continues to have intermittent dysphagia particularly with solids.  She has no other complaints today.    PAST MEDICAL HISTORY:  Past Medical History:   Diagnosis Date    Arthritis     Asthma     Endometriosis     Low back pain     Lumbosacral disc disease        PAST SURGICAL HISTORY:  Past Surgical History:   Procedure Laterality Date     SECTION      COLONOSCOPY N/A 2024    Procedure: COLONOSCOPY FOR SCREENING;  Surgeon: Autumn Mckee MD;  Location: [facility] OR;  Service: Gastroenterology;  Laterality: N/A;    ENDOSCOPY N/A 2024    Procedure: ESOPHAGOGASTRODUODENOSCOPY WITH BIOPSY;  Surgeon: Autumn Mckee MD;  Location: [facility] OR;  Service: Gastroenterology;  Laterality: N/A;    HYSTEROSCOPY ENDOMETRIAL ABLATION      WISDOM TOOTH EXTRACTION         FAMILY HISTORY:  Family History   Problem Relation Age of Onset    Asthma Mother     Arthritis Mother     Breast cancer Neg Hx        SOCIAL  HISTORY:  Social History     Socioeconomic History    Marital status: Single   Tobacco Use    Smoking status: Former     Types: Cigarettes    Smokeless tobacco: Never   Vaping Use    Vaping status: Never Used   Substance and Sexual Activity    Alcohol use: Never    Drug use: Never    Sexual activity: Defer     MEDICATIONS:  The current medication list was reviewed in the EMR    Current Outpatient Medications:     albuterol sulfate  (90 Base) MCG/ACT inhaler, Ventolin HFA 90 mcg/actuation aerosol inhaler  INHALE 2 PUFFS BY MOUTH EVERY 4 HOURS AS NEEDED, Disp: , Rfl:     Anefrin Spray 0.05 % nasal spray, USE 3 SPRAYS IN EACH NOSTRIL EVERY 12 HOURS FOR 5 DAYS AS NEEDED FOR NASAL CONGESTION, Disp: , Rfl:     baclofen (LIORESAL) 10 MG tablet, Take 1 tablet by mouth 2 (Two) Times a Day., Disp: , Rfl:     buPROPion (WELLBUTRIN) 75 MG tablet, Take 1 tablet by mouth Daily., Disp: , Rfl:     Dulera 200-5 MCG/ACT inhaler, Inhale 2 puffs 2 (Two) Times a Day., Disp: , Rfl:     lansoprazole (PREVACID) 30 MG capsule, Take 1 capsule by mouth 2 (Two) Times a Day. Take one capsule 30 minutes prior to eating breakfast, Disp: 60 capsule, Rfl: 5    loratadine (CLARITIN) 10 MG tablet, Take 1 tablet by mouth Every Morning., Disp: , Rfl:     methocarbamol (ROBAXIN) 750 MG tablet, Take 1 tablet by mouth 3 (Three) Times a Day., Disp: , Rfl:     montelukast (SINGULAIR) 10 MG tablet, Take 1 tablet by mouth every night at bedtime., Disp: , Rfl:     pregabalin (LYRICA) 100 MG capsule, Take 1 capsule by mouth Every 12 (Twelve) Hours., Disp: , Rfl:     sodium-potassium-magnesium sulfates (SUPREP) 17.5-3.13-1.6 GM/177ML solution oral solution, Take 1 bottle by mouth Take As Directed for 2 doses. Take one bottle with 16oz of water. Then within the hour drink an additional 32oz of water., Disp: 354 mL, Rfl: 0    ALLERGIES:  No Known Allergies      REVIEW OF SYSTEMS:    A comprehensive 14 point review of systems was performed.  Significant  "findings as mentioned above.  All other systems reviewed and are negative.      Physical Exam   Vital Signs: BP 95/58 (BP Location: Left arm, Patient Position: Sitting, Cuff Size: Large Adult)   Pulse 96   Ht 175.3 cm (69\")   Wt 84.4 kg (186 lb)   BMI 27.47 kg/m²    General: Well developed, well nourished, alert and oriented x 3, in no acute distress.   Head: ATNC   Eyes: PERRL, No evidence of conjunctivitis.   Nose: No nasal discharge.   Mouth: Oral mucosal membranes moist. No oral ulceration or hemorrhages.   Neck: Neck supple. No thyromegaly. No JVD.   Lungs: Clear in all fields to A&P without rales, rhonchi or wheezing.   Heart: . Regular rate and rhythm. No murmurs, rubs, or gallops.   Abdomen: Soft. Bowel sounds are normoactive. Nontender with palpation.   Extremities: No cyanosis or edema.   Neurologic: Grossly non-focal exam      ASSESSMENT & PLAN:  Fidelina Osman is a very pleasant 49 y.o. female with    1.  GERD:  2.  Dysphagia (solids):    -She underwent EGD with Dr. Riec on 6/5/2024 and noted esophageal mucosal changes suggestive of eosinophilic esophagitis.  Biopsies were taken of the distal esophagus which revealed reflux esophagitis.  Biopsies of the midesophagus were negative for eosinophilic esophagitis.   -Subsequently she underwent esophagram on 6/17/2024 which revealed small hiatal hernia, otherwise unremarkable.  There was no evidence of stricture or dysmotility of the esophagus.  We reviewed the above findings today.  -Based on current history above, recommended to resume lansoprazole 30 mg p.o. twice daily which was previously helpful.  Refills provided.  With treatment of GERD/esophagitis, patient should have improvement of intermittent dysphagia which we discussed today.  -We again discussed lifestyle changes including weight loss and important dietary modifications, limiting triggers such as caffeine, chocolate, spicy foods, acidic foods, fried/greasy foods, food with high " fat content and carbonated beverages.    3.  Personal history of colon polyp (hyperplastic):  4.  Family history of colon polyps (mother):  5.  Family history of colon cancer (maternal grandmother):    Patient underwent colonoscopy with Dr. Rice on 6/5/2024 and had a polyp removed which was hyperplastic. Given patient's mother has history of several precancerous colon polyps as well as family history of colon cancer in her maternal grandmother, would recommend repeat colonoscopy in 5 years.   We reviewed findings/recommendations today.    Patient will return to clinic in 6 months for symptom check.    The patient was in agreement with the plan and all questions were answered to her satisfaction.     Thank you so much for allowing us to participate in the care of Fidelina Osman . Please do not hesitate to contact us with any questions or concerns.             Electronically Signed by: ENRIQUE Maza , July 16, 2024 14:51 EDT       CC:   Liz Daugherty APRN

## 2024-07-17 ENCOUNTER — OFFICE VISIT (OUTPATIENT)
Dept: GASTROENTEROLOGY | Facility: CLINIC | Age: 49
End: 2024-07-17
Payer: COMMERCIAL

## 2024-07-17 VITALS
DIASTOLIC BLOOD PRESSURE: 58 MMHG | BODY MASS INDEX: 27.55 KG/M2 | WEIGHT: 186 LBS | SYSTOLIC BLOOD PRESSURE: 95 MMHG | HEIGHT: 69 IN | HEART RATE: 96 BPM

## 2024-07-17 DIAGNOSIS — K21.9 GASTROESOPHAGEAL REFLUX DISEASE, UNSPECIFIED WHETHER ESOPHAGITIS PRESENT: ICD-10-CM

## 2024-07-17 DIAGNOSIS — R13.19 ESOPHAGEAL DYSPHAGIA: Primary | ICD-10-CM

## 2024-07-17 DIAGNOSIS — Z83.719 FAMILY HISTORY OF POLYPS IN THE COLON: ICD-10-CM

## 2024-07-17 DIAGNOSIS — Z80.0 FAMILY HISTORY OF COLON CANCER: ICD-10-CM

## 2024-07-17 PROCEDURE — 99214 OFFICE O/P EST MOD 30 MIN: CPT | Performed by: NURSE PRACTITIONER

## 2024-07-17 RX ORDER — HYDROCODONE BITARTRATE AND ACETAMINOPHEN 5; 325 MG/1; MG/1
TABLET ORAL
COMMUNITY
Start: 2024-07-08

## 2024-07-17 RX ORDER — LANSOPRAZOLE 30 MG/1
30 CAPSULE, DELAYED RELEASE ORAL 2 TIMES DAILY
Qty: 60 CAPSULE | Refills: 5 | Status: SHIPPED | OUTPATIENT
Start: 2024-07-17

## 2024-08-15 ENCOUNTER — OFFICE VISIT (OUTPATIENT)
Dept: PAIN MEDICINE | Facility: CLINIC | Age: 49
End: 2024-08-15
Payer: COMMERCIAL

## 2024-08-15 VITALS — HEIGHT: 69 IN | BODY MASS INDEX: 26.67 KG/M2 | WEIGHT: 180.1 LBS

## 2024-08-15 DIAGNOSIS — M46.1 SACROILIITIS: ICD-10-CM

## 2024-08-15 DIAGNOSIS — M47.817 LUMBOSACRAL SPONDYLOSIS WITHOUT MYELOPATHY: Primary | ICD-10-CM

## 2024-08-15 NOTE — PROGRESS NOTES
Patient reports she has had variable primary Physician: Liz Daugherty APRN    CHIEF COMPLAINT or REASON FOR VISIT: Follow-up, Sacroiliitis, and Back Pain (Pain is worse since last visit, difficulty standing up. /Left-sided. )      Initial HPI 3-2-2023:  Ms. Fdielina Osman is 49 y.o. female who presents as a new patient referral for evaluation treatment of chronic right-sided low back pain with radiation of the right hip and thigh.  Patient states that she has had this issue for approximately 10 years since falling onto her right-sided buttock area in 2014.  This is an old Worker's Comp. injury, patient currently pursuing disability.  She describes a lifelong history of ligamentous laxity including joint hypermobility and pelvic shifting.  She has completed physical therapy and engages routinely in chiropractic manipulation.  She was told by her physical therapist that she has a leg length discrepancy, recently started using a left shoe spacer.  Her sister also has the same ligamentous laxity.    She reports chronic achy pain at the left buttock radiating into the right posterior thigh terminating above the knee.  She denies numbness or tingling.  She does have to sit leaning away from her right side.  Around 2014 she underwent 6 interventional pain injections including lumbar epidural steroid injections.  She states the epidural injections only helped for about 1 week.  She subsequently had right-sided sacroiliac joint injections which provided over a month of relief.    She did see consultation with neurosurgery, Dr. Israel Moralez, who recommended conservative management including diagnostic injections to determine etiology of her pain.    Interval history: Patient returns to clinic today after undergoing a repeat SI joint injection.  Patient reports she has had variable relief from this procedure.  She primarily complains of chronic bilateral low back pain, left greater than right.  This pain is exacerbated  "with prolonged sitting, prolonged standing, or leaning forward.  She denies any specific radiation down her lower extremities.  She is not particularly having any pain within her right buttock/SI joint but will occasionally feel some burning in this area.  She denies any new injuries or events since her previous appointment.  She has been going to the chiropractor who reportedly has been \"popping her L5 back into place.\"  Occasionally, she feels as if her back will be \"locked up.\"  Patient reports she deals with ligamentous laxity and hypermobile joints.  She did recently undergo a updated lumbar x-ray which per radiologist report did not reveal any acute fracture.  It did reveal some facet hypertrophy as well as a minimal anterolisthesis of L5 on S1.  She denies any radiation down her lower legs.  Patient denies any bowel or bladder dysfunction, lower extremity weakness, new onset saddle anesthesia or unexplained weight loss.  Main complaint today is mainly axial back pain.     Interventions:    3/21/2023: Right SIJ injection with 100% relief for 3 weeks, now 75% relief 2 to 3 months  2023: Repeat right SIJ with 75% relief for 3-4 months  2024: Repeat right SIJ with variable relief.    Objective Pain Scoring:   BRIEF PAIN INVENTORY:  Total score:   Pain Score    08/15/24 1405   PainSc:   7   PainLoc: Back      PHQ-2: PHQ-2 Total Score: 2  PHQ-9: PHQ-9: Brief Depression Severity Measure Score: 7  Opioid Risk Tool:         Review of Systems:   ROS negative except as otherwise noted     Past Medical History:   Past Medical History:   Diagnosis Date    Arthritis     Asthma     Endometriosis     Hiatal hernia     Low back pain     Lumbosacral disc disease          Past Surgical History:   Past Surgical History:   Procedure Laterality Date     SECTION      COLONOSCOPY N/A 2024    Procedure: COLONOSCOPY FOR SCREENING;  Surgeon: Autumn Mckee MD;  Location: [facility] OR;  Service: " Gastroenterology;  Laterality: N/A;    ENDOSCOPY N/A 6/5/2024    Procedure: ESOPHAGOGASTRODUODENOSCOPY WITH BIOPSY;  Surgeon: Autumn Mckee MD;  Location: [facility];  Service: Gastroenterology;  Laterality: N/A;    HYSTEROSCOPY ENDOMETRIAL ABLATION      WISDOM TOOTH EXTRACTION           Family History   Family History   Problem Relation Age of Onset    Asthma Mother     Arthritis Mother     Breast cancer Neg Hx          Social History   Social History     Socioeconomic History    Marital status: Single   Tobacco Use    Smoking status: Former     Types: Cigarettes    Smokeless tobacco: Never   Vaping Use    Vaping status: Never Used   Substance and Sexual Activity    Alcohol use: Never    Drug use: Never    Sexual activity: Defer        Medications:     Current Outpatient Medications:     albuterol sulfate  (90 Base) MCG/ACT inhaler, Ventolin HFA 90 mcg/actuation aerosol inhaler  INHALE 2 PUFFS BY MOUTH EVERY 4 HOURS AS NEEDED, Disp: , Rfl:     Anefrin Spray 0.05 % nasal spray, USE 3 SPRAYS IN EACH NOSTRIL EVERY 12 HOURS FOR 5 DAYS AS NEEDED FOR NASAL CONGESTION, Disp: , Rfl:     baclofen (LIORESAL) 10 MG tablet, Take 1 tablet by mouth 2 (Two) Times a Day., Disp: , Rfl:     buPROPion (WELLBUTRIN) 75 MG tablet, Take 1 tablet by mouth Daily., Disp: , Rfl:     Dulera 200-5 MCG/ACT inhaler, Inhale 2 puffs 2 (Two) Times a Day., Disp: , Rfl:     HYDROcodone-acetaminophen (NORCO) 5-325 MG per tablet, TAKE 1 TABLET BY MOUTH EVERY 6 HOURS FOR 14 DAYS AS NEEDED, Disp: , Rfl:     lansoprazole (PREVACID) 30 MG capsule, Take 1 capsule by mouth 2 (Two) Times a Day. Take one capsule 30 minutes prior to eating breakfast, Disp: 60 capsule, Rfl: 5    loratadine (CLARITIN) 10 MG tablet, Take 1 tablet by mouth Every Morning., Disp: , Rfl:     methocarbamol (ROBAXIN) 750 MG tablet, Take 1 tablet by mouth 3 (Three) Times a Day., Disp: , Rfl:     montelukast (SINGULAIR) 10 MG tablet, Take 1 tablet by mouth every night  "at bedtime., Disp: , Rfl:     pregabalin (LYRICA) 100 MG capsule, Take 1 capsule by mouth Every 12 (Twelve) Hours., Disp: , Rfl:     sodium-potassium-magnesium sulfates (SUPREP) 17.5-3.13-1.6 GM/177ML solution oral solution, Take 1 bottle by mouth Take As Directed for 2 doses. Take one bottle with 16oz of water. Then within the hour drink an additional 32oz of water., Disp: 354 mL, Rfl: 0        Physical Exam:     Vitals:    08/15/24 1405   Weight: 81.7 kg (180 lb 1.6 oz)   Height: 175.3 cm (69.02\")   PainSc:   7   PainLoc: Back        General: Alert and oriented, No acute distress.   HEENT: Normocephalic, atraumatic.   Cardiovascular: No gross edema  Respiratory: Respirations are non-labored    Thoracic Spine:   Inspection: no gross abnormality  Paraspinal muscle palpation: nontender  Spinous process palpation: nontender    Lumbar Spine:   No masses or atrophy  Range of motion - Flexion normal. Extension normal.   Facet Loading: Positive bilaterally  Facet Palpation -positive bilaterally  PSIS tenderness -positive bilaterally right greater than left  Tino's/GREG/Thigh thrust -positive bilaterally right greater than left  Straight leg raise: Negative bilaterally  Slump test: Negative bilaterally    Motor Exam:    Strength: Rate on 1-5 scale Right Left    L1/2- hip flexion 5 5    L3- knee extension 5 5    L4- ankle dorsiflexion 5 5    L5- great toe extension 5 5    S1- ankle plantarflexion 5 5    Sensory Exam: Full and equal sensation to light touch throughout.    Neurologic: Cranial Nerves II-XII are grossly intact.   Clonus -negative bilaterally  Psychiatric: Cooperative.   Gait: Normal   Assistive Devices: None      Imaging Studies:   Results for orders placed during the hospital encounter of 11/18/22    MRI Lumbar Spine Without Contrast    Narrative  EXAMINATION: MRI LUMBAR SPINE WO CONTRAST-    CLINICAL INDICATION: M54.50; M54.50-Low back pain, unspecified    COMPARISON: None    TECHNIQUE: Multiplanar, " multisequence MR imaging performed through the  lumbar spine WITHOUT contrast.    FINDINGS:    HARDWARE: No MRI evidence of hardware.    NUMBERING/ALIGNMENT:?  5 lumbar vertebral body segments.  Intact vertebral body alignment.    SPINAL CORD:?  Conus terminates at the?L1 level.    BONES:  No fracture. No marrow signal abnormality.    POSTERIOR ELEMENTS:  Posterior elements are intact.    SOFT TISSUES:  The visualized paraspinal soft tissues are unremarkable.    T12-L1:  No disk bulge or protrusion.  No central canal or neuroforaminal  stenosis.    L1/2:  No disk bulge or protrusion.  No central canal or neuroforaminal  stenosis.    L2/3:  Mild annular disc bulge and small superimposed 2 mm central disc  protrusion. No stenosis. No annular tear.    L3/4:  Broad-based posterior disc bulge eccentric to left. Mild facet  arthropathy. Mild central canal and left neural foraminal stenosis.    L4/5:  Broad-based posterior disc bulge. Moderate facet arthropathy.  Mild-moderate central canal stenosis. Mild neural foraminal stenosis.    L5/S1:  No disk bulge or protrusion.  No central canal or neuroforaminal  stenosis.    OTHER:  No additional remarkable findings.    Impression  1. Mild multilevel degenerative disc disease and mild to moderate facet  arthropathy with mild-moderate central canal and neural foraminal  stenosis at the L3/4 and L4/5 levels.  2. No fracture or traumatic malalignment.    This report was finalized on 11/18/2022 2:23 PM by Dr. Parmjit Scales MD.      Impression & Plan:   3/2/2023: Fidelina Osman is a 49 y.o. female with past medical history significant for work fall in 2014 who presents to the pain clinic for evaluation and treatment of right-sided buttock pain.  I personally reviewed the patient's lumbar MRI dated 11/18/2022 which demonstrates moderate degenerative disc disease at L3/4 and L4/5 with Modic 1 endplate changes at L3/4; no significant canal or neuroforaminal stenosis.  Clinical  examination most consistent with right sacroiliac joint pain versus greater trochanteric bursitis.  Given significant benefit from prior SIJ injections reasonable to repeat diagnostic and therapeutic right sacroiliac joint injection.  If short-term relief, will proceed with second diagnostic SIJ injection and can consider right sacroiliac joint fusion.  If no benefit whatsoever can consider right L4/5 transforaminal epidural steroid injection, SCS trial.  4/24/2023: Excellent relief with right SIJ.  Discussed repeat every 3 to 4 months versus fusion.  7/26/2023: Return of right SIJ pain.  We will schedule repeat right SIJ injection.  4/1/2024: Good relief from repeat right SIJ.  Will schedule repeat injection.  Can consider right SI joint fusion with PainTeq  8/15/2024: Variable relief from repeat SIJ.  Symptoms more consistent with lumbosacral spondylosis.  Will proceed with bilateral L3/4 and L4/5 LMBB and RFA if appropriate risk of procedure include bleeding, infection, damage to surrounding structures which could exacerbate symptoms    1. Lumbosacral spondylosis without myelopathy    2. Sacroiliitis              PLAN:  1. Medication Recommendations: Recommend Voltaren topical, NSAIDs, Tylenol.  Can trial turmeric 500 mg twice daily if NSAID contraindicated.    2. Physical Therapy: Continue HEP    3. Psychological: defer    4. Complementary and alternative (CAM) Therapies:     5. Labs: None indicated     6. Imaging: Lumbar x-ray report reviewed    7. Interventions: Schedule bilateral L3/4 and L4/5 lumbar medial branch blocks ([id number], [id number]). If the first block provides diagnostic relief we will schedule a second set of medial branch blocks.  If second set of medial branch blocks provides diagnostic relief we will schedule rhizotomy.     8. Referrals: None indicated     9. Records requested: n/a    10. Lifestyle goals:    Follow-up 4 months       [facility]  Gauri Atul Crisp,  PA-C    Quality metrics:  Fidelina Osman reports a pain score of 7.  Given her pain assessment as noted, treatment options were discussed and the following options were decided upon as a follow-up plan to address the patient's pain: continuation of current treatment plan for pain.

## 2024-08-28 ENCOUNTER — TRANSCRIBE ORDERS (OUTPATIENT)
Dept: ADMINISTRATIVE | Facility: HOSPITAL | Age: 49
End: 2024-08-28
Payer: COMMERCIAL

## 2024-08-28 DIAGNOSIS — R60.0 LOWER EXTREMITY EDEMA: Primary | ICD-10-CM

## 2024-09-03 ENCOUNTER — HOSPITAL ENCOUNTER (OUTPATIENT)
Dept: ULTRASOUND IMAGING | Facility: HOSPITAL | Age: 49
Discharge: HOME | End: 2024-09-03
Admitting: NURSE PRACTITIONER
Payer: COMMERCIAL

## 2024-09-03 DIAGNOSIS — R60.0 LOWER EXTREMITY EDEMA: ICD-10-CM

## 2024-09-03 PROCEDURE — 93970 EXTREMITY STUDY: CPT

## 2024-09-10 ENCOUNTER — OUTSIDE FACILITY SERVICE (OUTPATIENT)
Dept: PAIN MEDICINE | Facility: CLINIC | Age: 49
End: 2024-09-10
Payer: COMMERCIAL

## 2024-09-10 ENCOUNTER — DOCUMENTATION (OUTPATIENT)
Dept: PAIN MEDICINE | Facility: CLINIC | Age: 49
End: 2024-09-10

## 2024-09-10 PROCEDURE — 64494 INJ PARAVERT F JNT L/S 2 LEV: CPT | Performed by: STUDENT IN AN ORGANIZED HEALTH CARE EDUCATION/TRAINING PROGRAM

## 2024-09-10 PROCEDURE — 64493 INJ PARAVERT F JNT L/S 1 LEV: CPT | Performed by: STUDENT IN AN ORGANIZED HEALTH CARE EDUCATION/TRAINING PROGRAM

## 2024-09-10 NOTE — PROGRESS NOTES
[facility]  [address] [place] [address]    PROCEDURE: Fluoroscopically-guided L2,3,4 Lumbar Medial Branch Nerve Blocks targeting the bilateral L3/4 and L4/5 facet joints  PRE-OP DIAGNOSIS: Lumbar spondylosis  POST-OP DIAGNOSIS: Lumbar spondylosis    ANTIPLATELET/ANTICOAGULANT STOP DATE: Discussed with the patient held according to ALISSA guidelines    CONSENT: Risks, benefits and options were explained to the patient, all questions were answered and written informed consent was obtained.  ANESTHESIA:  Local only  PROCEDURE NOTE:  A pre-procedural time out was performed to confirm the correct patient, procedure, side, and site. A sterile field was prepped in standard fashion using Chlorhexidine and draped with sterile towels. The selected medial branch nerves were identified using an ipsilateral oblique fluoroscopic view. A 25 gauge 3.5 inch spinal needle was advanced using intermittent fluoroscopy toward the junction of the transverse process and superior articulating process targeting the above listed medial branch nerves. Needle placement was confirmed with biplanar fluoroscopic imaging. Following negative aspiration, 1 mL of bupivacaine 0.5% was injected at each location. The needles were re-styletted and withdrawn. The patient's skin was cleaned with alcohol and the injection sites covered with bandages.   EBL: None  COMPLICATIONS: None      The patient was monitored until meeting established discharge criteria.  Vital signs remained stable throughout the procedure and in the recovery area. There were no immediate complications and the patient tolerated the procedure well. Sensory and motor exam was unchanged from baseline. The patient received written discharge instructions prior to discharge.  FOLLOW UP: As scheduled  ADDITIONAL NOTES: Clinic staff will call to schedule #2 MBB    [facility]    Darwin Riley  MD    Codes:  [phone]  [phone]

## 2024-09-11 ENCOUNTER — TELEPHONE (OUTPATIENT)
Dept: PAIN MEDICINE | Facility: CLINIC | Age: 49
End: 2024-09-11
Payer: COMMERCIAL

## 2024-09-30 ENCOUNTER — OFFICE VISIT (OUTPATIENT)
Dept: PAIN MEDICINE | Facility: CLINIC | Age: 49
End: 2024-09-30
Payer: COMMERCIAL

## 2024-09-30 VITALS — WEIGHT: 176 LBS | BODY MASS INDEX: 26.07 KG/M2 | HEIGHT: 69 IN

## 2024-09-30 DIAGNOSIS — M62.5A2 ATROPHY OF MUSCLE OF BACK AT LUMBOSACRAL LEVEL: Primary | ICD-10-CM

## 2024-09-30 DIAGNOSIS — G89.4 CHRONIC PAIN SYNDROME: ICD-10-CM

## 2024-09-30 PROCEDURE — 99214 OFFICE O/P EST MOD 30 MIN: CPT | Performed by: STUDENT IN AN ORGANIZED HEALTH CARE EDUCATION/TRAINING PROGRAM

## 2024-09-30 NOTE — PROGRESS NOTES
Patient reports she has had variable primary Physician: Liz Daugherty APRN    CHIEF COMPLAINT or REASON FOR VISIT: Sacroiliitis and Follow-up      Initial HPI 3-2-2023:  Ms. Fidelina Osman is 49 y.o. female who presents as a new patient referral for evaluation treatment of chronic right-sided low back pain with radiation of the right hip and thigh.  Patient states that she has had this issue for approximately 10 years since falling onto her right-sided buttock area in 2014.  This is an old Worker's Comp. injury, patient currently pursuing disability.  She describes a lifelong history of ligamentous laxity including joint hypermobility and pelvic shifting.  She has completed physical therapy and engages routinely in chiropractic manipulation.  She was told by her physical therapist that she has a leg length discrepancy, recently started using a left shoe spacer.  Her sister also has the same ligamentous laxity.    She reports chronic achy pain at the left buttock radiating into the right posterior thigh terminating above the knee.  She denies numbness or tingling.  She does have to sit leaning away from her right side.  Around 2014 she underwent 6 interventional pain injections including lumbar epidural steroid injections.  She states the epidural injections only helped for about 1 week.  She subsequently had right-sided sacroiliac joint injections which provided over a month of relief.    She did see consultation with neurosurgery, Dr. Israel Moralez, who recommended conservative management including diagnostic injections to determine etiology of her pain.    Interval history: Patient returns to clinic today after diagnostic medial branch blocks without relief.  We had a long discussion about the 2 different types of pain that she is experiencing.  She has an achy right-sided buttock pain which has historically been well treated with sacroiliac joint injections. The more functionally limiting pain has been her  axial low back pain which is worse on bending forward and especially when trying to get up from a flexed forward position.  Unresponsive to lumbar medial branch blocks.    Interventions:    3/21/2023: Right SIJ injection with 100% relief for 3 weeks, now 75% relief 2 to 3 months  2023: Repeat right SIJ with 75% relief for 3-4 months  2024: Repeat right SIJ with variable relief.  9/10/24: bilateral L3/4 and L4/5 LMBB without relief    Objective Pain Scoring:   BRIEF PAIN INVENTORY:  Total score:   Pain Score    24 1017   PainSc:   7   PainLoc: Back      PHQ-2: PHQ-2 Total Score: 3  PHQ-9: PHQ-9: Brief Depression Severity Measure Score: 10  Opioid Risk Tool:         Review of Systems:   ROS negative except as otherwise noted     Past Medical History:   Past Medical History:   Diagnosis Date    Arthritis     Asthma     Endometriosis     Hiatal hernia     Low back pain     Lumbosacral disc disease          Past Surgical History:   Past Surgical History:   Procedure Laterality Date     SECTION      COLONOSCOPY N/A 2024    Procedure: COLONOSCOPY FOR SCREENING;  Surgeon: Autumn Mckee MD;  Location: [facility];  Service: Gastroenterology;  Laterality: N/A;    ENDOSCOPY N/A 2024    Procedure: ESOPHAGOGASTRODUODENOSCOPY WITH BIOPSY;  Surgeon: Autumn Mckee MD;  Location: [facility];  Service: Gastroenterology;  Laterality: N/A;    HYSTEROSCOPY ENDOMETRIAL ABLATION      WISDOM TOOTH EXTRACTION           Family History   Family History   Problem Relation Age of Onset    Asthma Mother     Arthritis Mother     Breast cancer Neg Hx          Social History   Social History     Socioeconomic History    Marital status: Single   Tobacco Use    Smoking status: Former     Types: Cigarettes    Smokeless tobacco: Never   Vaping Use    Vaping status: Never Used   Substance and Sexual Activity    Alcohol use: Never    Drug use: Never    Sexual activity: Defer        Medications:  "    Current Outpatient Medications:     albuterol sulfate  (90 Base) MCG/ACT inhaler, Ventolin HFA 90 mcg/actuation aerosol inhaler  INHALE 2 PUFFS BY MOUTH EVERY 4 HOURS AS NEEDED, Disp: , Rfl:     Anefrin Spray 0.05 % nasal spray, USE 3 SPRAYS IN EACH NOSTRIL EVERY 12 HOURS FOR 5 DAYS AS NEEDED FOR NASAL CONGESTION, Disp: , Rfl:     baclofen (LIORESAL) 10 MG tablet, Take 1 tablet by mouth 2 (Two) Times a Day., Disp: , Rfl:     buPROPion (WELLBUTRIN) 75 MG tablet, Take 1 tablet by mouth Daily., Disp: , Rfl:     Dulera 200-5 MCG/ACT inhaler, Inhale 2 puffs 2 (Two) Times a Day., Disp: , Rfl:     HYDROcodone-acetaminophen (NORCO) 5-325 MG per tablet, TAKE 1 TABLET BY MOUTH EVERY 6 HOURS FOR 14 DAYS AS NEEDED, Disp: , Rfl:     lansoprazole (PREVACID) 30 MG capsule, Take 1 capsule by mouth 2 (Two) Times a Day. Take one capsule 30 minutes prior to eating breakfast, Disp: 60 capsule, Rfl: 5    loratadine (CLARITIN) 10 MG tablet, Take 1 tablet by mouth Every Morning., Disp: , Rfl:     methocarbamol (ROBAXIN) 750 MG tablet, Take 1 tablet by mouth 3 (Three) Times a Day., Disp: , Rfl:     montelukast (SINGULAIR) 10 MG tablet, Take 1 tablet by mouth every night at bedtime., Disp: , Rfl:     pregabalin (LYRICA) 100 MG capsule, Take 1 capsule by mouth Every 12 (Twelve) Hours., Disp: , Rfl:     sodium-potassium-magnesium sulfates (SUPREP) 17.5-3.13-1.6 GM/177ML solution oral solution, Take 1 bottle by mouth Take As Directed for 2 doses. Take one bottle with 16oz of water. Then within the hour drink an additional 32oz of water., Disp: 354 mL, Rfl: 0        Physical Exam:     Vitals:    09/30/24 1017   Weight: 79.8 kg (176 lb)   Height: 175.3 cm (69\")   PainSc:   7   PainLoc: Back        General: Alert and oriented, No acute distress.   HEENT: Normocephalic, atraumatic.   Cardiovascular: No gross edema  Respiratory: Respirations are non-labored    Thoracic Spine:   Inspection: no gross abnormality  Paraspinal muscle " palpation: nontender  Spinous process palpation: nontender    Lumbar Spine:   No masses or atrophy  Range of motion - Flexion normal. Extension normal.   Facet Loading: Positive bilaterally  Facet Palpation -positive bilaterally  PSIS tenderness -positive bilaterally right greater than left  Tino's/GREG/Thigh thrust -positive bilaterally right greater than left  Straight leg raise: Negative bilaterally  Slump test: Negative bilaterally  Multifidus toe-touch test: Positive  Multifidus lift test: Positive bilaterally    Motor Exam:    Strength: Rate on 1-5 scale Right Left    L1/2- hip flexion 5 5    L3- knee extension 5 5    L4- ankle dorsiflexion 5 5    L5- great toe extension 5 5    S1- ankle plantarflexion 5 5    Sensory Exam: Full and equal sensation to light touch throughout.    Neurologic: Cranial Nerves II-XII are grossly intact.   Clonus -negative bilaterally  Psychiatric: Cooperative.   Gait: Normal   Assistive Devices: None      Imaging Studies:   Results for orders placed during the hospital encounter of 11/18/22    MRI Lumbar Spine Without Contrast    Narrative  EXAMINATION: MRI LUMBAR SPINE WO CONTRAST-    CLINICAL INDICATION: M54.50; M54.50-Low back pain, unspecified    COMPARISON: None    TECHNIQUE: Multiplanar, multisequence MR imaging performed through the  lumbar spine WITHOUT contrast.    FINDINGS:    HARDWARE: No MRI evidence of hardware.    NUMBERING/ALIGNMENT:?  5 lumbar vertebral body segments.  Intact vertebral body alignment.    SPINAL CORD:?  Conus terminates at the?L1 level.    BONES:  No fracture. No marrow signal abnormality.    POSTERIOR ELEMENTS:  Posterior elements are intact.    SOFT TISSUES:  The visualized paraspinal soft tissues are unremarkable.    T12-L1:  No disk bulge or protrusion.  No central canal or neuroforaminal  stenosis.    L1/2:  No disk bulge or protrusion.  No central canal or neuroforaminal  stenosis.    L2/3:  Mild annular disc bulge and small superimposed 2 mm  central disc  protrusion. No stenosis. No annular tear.    L3/4:  Broad-based posterior disc bulge eccentric to left. Mild facet  arthropathy. Mild central canal and left neural foraminal stenosis.    L4/5:  Broad-based posterior disc bulge. Moderate facet arthropathy.  Mild-moderate central canal stenosis. Mild neural foraminal stenosis.    L5/S1:  No disk bulge or protrusion.  No central canal or neuroforaminal  stenosis.    OTHER:  No additional remarkable findings.    Impression  1. Mild multilevel degenerative disc disease and mild to moderate facet  arthropathy with mild-moderate central canal and neural foraminal  stenosis at the L3/4 and L4/5 levels.  2. No fracture or traumatic malalignment.    This report was finalized on 11/18/2022 2:23 PM by Dr. Parmjit Scales MD.      Impression & Plan:   3/2/2023: Fidelina Osman is a 49 y.o. female with past medical history significant for work fall in 2014 who presents to the pain clinic for evaluation and treatment of right-sided buttock pain.  I personally reviewed the patient's lumbar MRI dated 11/18/2022 which demonstrates moderate degenerative disc disease at L3/4 and L4/5 with Modic 1 endplate changes at L3/4; no significant canal or neuroforaminal stenosis.  Clinical examination most consistent with right sacroiliac joint pain versus greater trochanteric bursitis.  Given significant benefit from prior SIJ injections reasonable to repeat diagnostic and therapeutic right sacroiliac joint injection.  If short-term relief, will proceed with second diagnostic SIJ injection and can consider right sacroiliac joint fusion.  If no benefit whatsoever can consider right L4/5 transforaminal epidural steroid injection, SCS trial.  4/24/2023: Excellent relief with right SIJ.  Discussed repeat every 3 to 4 months versus fusion.  7/26/2023: Return of right SIJ pain.  We will schedule repeat right SIJ injection.  4/1/2024: Good relief from repeat right SIJ.  Will schedule repeat  injection.  Can consider right SI joint fusion with PainTeq  8/15/2024: Variable relief from repeat SIJ.  Symptoms more consistent with lumbosacral spondylosis.  Will proceed with bilateral L3/4 and L4/5 LMBB and RFA if appropriate risk of procedure include bleeding, infection, damage to surrounding structures which could exacerbate symptoms  9/30/2024: Good relief from prior SIJ injections.  Pain is returned and patient would like to repeat.  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  We discussed restorative neuromodulation for this issue.  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  I had a discussion with the patient regarding the risks of the surgery including bleeding, infection, damage to surrounding structures.    1. Atrophy of muscle of back at lumbosacral level    2. Chronic pain syndrome                PLAN:  1. Medication Recommendations: Recommend Voltaren topical, NSAIDs, Tylenol.  Can trial turmeric 500 mg twice daily if NSAID contraindicated.    2. Physical Therapy: Continue HEP    3. Psychological: defer    4. Complementary and alternative (CAM) Therapies:     5. Labs: None indicated     6. Imaging: MRI findings demonstrate bilateral multifidus dysfunction.    7. Interventions: Schedule right sacroiliac joint steroid injection: ([id number]). Schedule ReActiv8 restorative neurostimulation peripheral nerve stimulator ([id number], [id number])    8. Referrals: None indicated     9. Records requested: n/a    10. Lifestyle goals:    Follow-up 10 days after implant      [facility]  Gauri Escalona PA-C    Quality metrics:  Fidelina Osman reports a pain score of 7.  Given her pain assessment as noted, treatment options were discussed and the following options were decided upon as a follow-up plan to address the patient's pain: continuation of current treatment plan for pain.

## 2024-10-15 ENCOUNTER — OUTSIDE FACILITY SERVICE (OUTPATIENT)
Dept: PAIN MEDICINE | Facility: CLINIC | Age: 49
End: 2024-10-15
Payer: COMMERCIAL

## 2025-01-09 ENCOUNTER — OFFICE VISIT (OUTPATIENT)
Dept: PAIN MEDICINE | Facility: CLINIC | Age: 50
End: 2025-01-09
Payer: COMMERCIAL

## 2025-01-09 VITALS — BODY MASS INDEX: 25.62 KG/M2 | WEIGHT: 173 LBS | HEIGHT: 69 IN

## 2025-01-09 DIAGNOSIS — M62.5A2 ATROPHY OF MUSCLE OF BACK AT LUMBOSACRAL LEVEL: Primary | ICD-10-CM

## 2025-01-09 DIAGNOSIS — M47.817 LUMBOSACRAL SPONDYLOSIS WITHOUT MYELOPATHY: ICD-10-CM

## 2025-01-09 DIAGNOSIS — M46.1 SACROILIITIS: ICD-10-CM

## 2025-01-09 DIAGNOSIS — G89.4 CHRONIC PAIN SYNDROME: ICD-10-CM

## 2025-01-09 PROCEDURE — 99213 OFFICE O/P EST LOW 20 MIN: CPT

## 2025-01-09 NOTE — PROGRESS NOTES
Patient reports she has had variable primary Physician: Liz Daugherty APRN    CHIEF COMPLAINT or REASON FOR VISIT: Follow-up and Back Pain      Initial HPI 3-2-2023:  Ms. Fidelina Osman is 49 y.o. female who presents as a new patient referral for evaluation treatment of chronic right-sided low back pain with radiation of the right hip and thigh.  Patient states that she has had this issue for approximately 10 years since falling onto her right-sided buttock area in 2014.  This is an old Worker's Comp. injury, patient currently pursuing disability.  She describes a lifelong history of ligamentous laxity including joint hypermobility and pelvic shifting.  She has completed physical therapy and engages routinely in chiropractic manipulation.  She was told by her physical therapist that she has a leg length discrepancy, recently started using a left shoe spacer.  Her sister also has the same ligamentous laxity.    She reports chronic achy pain at the left buttock radiating into the right posterior thigh terminating above the knee.  She denies numbness or tingling.  She does have to sit leaning away from her right side.  Around 2014 she underwent 6 interventional pain injections including lumbar epidural steroid injections.  She states the epidural injections only helped for about 1 week.  She subsequently had right-sided sacroiliac joint injections which provided over a month of relief.    She did see consultation with neurosurgery, Dr. Israel Moralez, who recommended conservative management including diagnostic injections to determine etiology of her pain.    Interval history:   Patient returns to clinic today.  Her last office visit she was set to undergo a repeat right sacroiliac joint injection as well as bilateral multifidus motor stimulator.  Patient did cancel both of these procedures.  Today, she continues to complain of chronic right-sided lower back/buttock pain as well as diffuse axial low back pain.  She  "has been going to the chiropractor for her sacroiliac joint pain with good relief.  Whenever she feels an increase in symptoms she goes to the chiropractor and gets \"realigned.\"  Again, we did discuss bilateral multifidus motor stimulation at today's appointment.  Patient is interested in this procedure but is hesitant to schedule it at this time.  She does have some concerns regarding it being a newer procedure as well as the recovery from the procedure.  She reports that she is to schedule this procedure then it would have to be in the summer when her son is out of school.  She denies any new injuries or events since her previous appointment.    Interventions:    3/21/2023: Right SIJ injection with 100% relief for 3 weeks, now 75% relief 2 to 3 months  2023: Repeat right SIJ with 75% relief for 3-4 months  2024: Repeat right SIJ with variable relief.  9/10/24: bilateral L3/4 and L4/5 LMBB without relief    Objective Pain Scoring:   BRIEF PAIN INVENTORY:  Total score:   Pain Score    25 1059   PainSc:   8   PainLoc: Back      PHQ-2:    PHQ-9:    Opioid Risk Tool:         Review of Systems:   ROS negative except as otherwise noted     Past Medical History:   Past Medical History:   Diagnosis Date    Arthritis     Asthma     Endometriosis     Hiatal hernia     Low back pain     Lumbosacral disc disease          Past Surgical History:   Past Surgical History:   Procedure Laterality Date     SECTION      COLONOSCOPY N/A 2024    Procedure: COLONOSCOPY FOR SCREENING;  Surgeon: Autumn Mckee MD;  Location: [facility] OR;  Service: Gastroenterology;  Laterality: N/A;    ENDOSCOPY N/A 2024    Procedure: ESOPHAGOGASTRODUODENOSCOPY WITH BIOPSY;  Surgeon: Autumn Mckee MD;  Location: [facility] OR;  Service: Gastroenterology;  Laterality: N/A;    HYSTEROSCOPY ENDOMETRIAL ABLATION      WISDOM TOOTH EXTRACTION           Family History   Family History   Problem Relation Age of " Onset    Asthma Mother     Arthritis Mother     Breast cancer Neg Hx          Social History   Social History     Socioeconomic History    Marital status: Single   Tobacco Use    Smoking status: Former     Types: Cigarettes    Smokeless tobacco: Never   Vaping Use    Vaping status: Never Used   Substance and Sexual Activity    Alcohol use: Never    Drug use: Never    Sexual activity: Defer        Medications:     Current Outpatient Medications:     albuterol sulfate  (90 Base) MCG/ACT inhaler, Ventolin HFA 90 mcg/actuation aerosol inhaler  INHALE 2 PUFFS BY MOUTH EVERY 4 HOURS AS NEEDED, Disp: , Rfl:     Anefrin Spray 0.05 % nasal spray, USE 3 SPRAYS IN EACH NOSTRIL EVERY 12 HOURS FOR 5 DAYS AS NEEDED FOR NASAL CONGESTION, Disp: , Rfl:     baclofen (LIORESAL) 10 MG tablet, Take 1 tablet by mouth 2 (Two) Times a Day., Disp: , Rfl:     buPROPion (WELLBUTRIN) 75 MG tablet, Take 1 tablet by mouth Daily., Disp: , Rfl:     Dulera 200-5 MCG/ACT inhaler, Inhale 2 puffs 2 (Two) Times a Day., Disp: , Rfl:     lansoprazole (PREVACID) 30 MG capsule, Take 1 capsule by mouth 2 (Two) Times a Day. Take one capsule 30 minutes prior to eating breakfast, Disp: 60 capsule, Rfl: 5    loratadine (CLARITIN) 10 MG tablet, Take 1 tablet by mouth Every Morning., Disp: , Rfl:     methocarbamol (ROBAXIN) 750 MG tablet, Take 1 tablet by mouth 3 (Three) Times a Day., Disp: , Rfl:     montelukast (SINGULAIR) 10 MG tablet, Take 1 tablet by mouth every night at bedtime., Disp: , Rfl:     pregabalin (LYRICA) 100 MG capsule, Take 1 capsule by mouth Every 12 (Twelve) Hours., Disp: , Rfl:     sodium-potassium-magnesium sulfates (SUPREP) 17.5-3.13-1.6 GM/177ML solution oral solution, Take 1 bottle by mouth Take As Directed for 2 doses. Take one bottle with 16oz of water. Then within the hour drink an additional 32oz of water., Disp: 354 mL, Rfl: 0        Physical Exam:     Vitals:    01/09/25 1059   Weight: 78.5 kg (173 lb)   Height: 175.3 cm  "(69.02\")   PainSc:   8   PainLoc: Back        General: Alert and oriented, No acute distress.   HEENT: Normocephalic, atraumatic.   Cardiovascular: No gross edema  Respiratory: Respirations are non-labored    Thoracic Spine:   Inspection: no gross abnormality  Paraspinal muscle palpation: nontender  Spinous process palpation: nontender    Lumbar Spine:   No masses or atrophy  Range of motion - Flexion normal. Extension normal.   Facet Loading: Positive bilaterally  Facet Palpation -positive bilaterally  PSIS tenderness -positive bilaterally right greater than left  Tino's/GREG/Thigh thrust -positive bilaterally right greater than left  Straight leg raise: Negative bilaterally  Slump test: Negative bilaterally  Multifidus toe-touch test: Positive  Multifidus lift test: Positive bilaterally    Motor Exam:    Strength: Rate on 1-5 scale Right Left    L1/2- hip flexion 5 5    L3- knee extension 5 5    L4- ankle dorsiflexion 5 5    L5- great toe extension 5 5    S1- ankle plantarflexion 5 5    Sensory Exam: Full and equal sensation to light touch throughout.    Neurologic: Cranial Nerves II-XII are grossly intact.   Clonus -negative bilaterally  Psychiatric: Cooperative.   Gait: Normal   Assistive Devices: None      Imaging Studies:   Results for orders placed during the hospital encounter of 11/18/22    MRI Lumbar Spine Without Contrast    Narrative  EXAMINATION: MRI LUMBAR SPINE WO CONTRAST-    CLINICAL INDICATION: M54.50; M54.50-Low back pain, unspecified    COMPARISON: None    TECHNIQUE: Multiplanar, multisequence MR imaging performed through the  lumbar spine WITHOUT contrast.    FINDINGS:    HARDWARE: No MRI evidence of hardware.    NUMBERING/ALIGNMENT:?  5 lumbar vertebral body segments.  Intact vertebral body alignment.    SPINAL CORD:?  Conus terminates at the?L1 level.    BONES:  No fracture. No marrow signal abnormality.    POSTERIOR ELEMENTS:  Posterior elements are intact.    SOFT TISSUES:  The " visualized paraspinal soft tissues are unremarkable.    T12-L1:  No disk bulge or protrusion.  No central canal or neuroforaminal  stenosis.    L1/2:  No disk bulge or protrusion.  No central canal or neuroforaminal  stenosis.    L2/3:  Mild annular disc bulge and small superimposed 2 mm central disc  protrusion. No stenosis. No annular tear.    L3/4:  Broad-based posterior disc bulge eccentric to left. Mild facet  arthropathy. Mild central canal and left neural foraminal stenosis.    L4/5:  Broad-based posterior disc bulge. Moderate facet arthropathy.  Mild-moderate central canal stenosis. Mild neural foraminal stenosis.    L5/S1:  No disk bulge or protrusion.  No central canal or neuroforaminal  stenosis.    OTHER:  No additional remarkable findings.    Impression  1. Mild multilevel degenerative disc disease and mild to moderate facet  arthropathy with mild-moderate central canal and neural foraminal  stenosis at the L3/4 and L4/5 levels.  2. No fracture or traumatic malalignment.    This report was finalized on 11/18/2022 2:23 PM by Dr. Parmjit Scales MD.      Impression & Plan:   3/2/2023: Fidelina Osman is a 49 y.o. female with past medical history significant for work fall in 2014 who presents to the pain clinic for evaluation and treatment of right-sided buttock pain.  I personally reviewed the patient's lumbar MRI dated 11/18/2022 which demonstrates moderate degenerative disc disease at L3/4 and L4/5 with Modic 1 endplate changes at L3/4; no significant canal or neuroforaminal stenosis.  Clinical examination most consistent with right sacroiliac joint pain versus greater trochanteric bursitis.  Given significant benefit from prior SIJ injections reasonable to repeat diagnostic and therapeutic right sacroiliac joint injection.  If short-term relief, will proceed with second diagnostic SIJ injection and can consider right sacroiliac joint fusion.  If no benefit whatsoever can consider right L4/5 transforaminal  epidural steroid injection, SCS trial.  4/24/2023: Excellent relief with right SIJ.  Discussed repeat every 3 to 4 months versus fusion.  7/26/2023: Return of right SIJ pain.  We will schedule repeat right SIJ injection.  4/1/2024: Good relief from repeat right SIJ.  Will schedule repeat injection.  Can consider right SI joint fusion with PainTeq  8/15/2024: Variable relief from repeat SIJ.  Symptoms more consistent with lumbosacral spondylosis.  Will proceed with bilateral L3/4 and L4/5 LMBB and RFA if appropriate risk of procedure include bleeding, infection, damage to surrounding structures which could exacerbate symptoms  9/30/2024: Good relief from prior SIJ injections.  Pain is returned and patient would like to repeat.  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  We discussed restorative neuromodulation for this issue.  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  I had a discussion with the patient regarding the risks of the surgery including bleeding, infection, damage to surrounding structures.  1/9/2025: Discussed restorative neuromodulation for multifidus atrophy.  Patient interested in this procedure but is hesitant to schedule at this time.  She prefers 6-month follow-up but will reach out to the office if she is interested in scheduling this procedure.    1. Atrophy of muscle of back at lumbosacral level    2. Chronic pain syndrome    3. Sacroiliitis    4. Lumbosacral spondylosis without myelopathy                  PLAN:  1. Medication Recommendations: Recommend Voltaren topical, NSAIDs, Tylenol.  Can trial turmeric 500 mg twice daily if NSAID contraindicated.    2. Physical Therapy: Continue HEP    3. Psychological: defer    4. Complementary and alternative (CAM) Therapies:     5. Labs: None indicated     6. Imaging: MRI findings demonstrate bilateral multifidus dysfunction.    7. Interventions: Can consider scheduling ReActiv8  restorative neurostimulation peripheral nerve stimulator ([id number], [id number]) if patient is interested  -Can consider repeat right SIJ    8. Referrals: None indicated     9. Records requested: n/a    10. Lifestyle goals:    Follow-up 6-month      [facility]  Gauri Escalona PA-C    Quality metrics:  Fidelina Osman reports a pain score of 8.  Given her pain assessment as noted, treatment options were discussed and the following options were decided upon as a follow-up plan to address the patient's pain: continuation of current treatment plan for pain.

## 2025-01-17 ENCOUNTER — OFFICE VISIT (OUTPATIENT)
Dept: GASTROENTEROLOGY | Facility: CLINIC | Age: 50
End: 2025-01-17
Payer: COMMERCIAL

## 2025-01-17 VITALS
SYSTOLIC BLOOD PRESSURE: 108 MMHG | WEIGHT: 166 LBS | DIASTOLIC BLOOD PRESSURE: 68 MMHG | BODY MASS INDEX: 24.59 KG/M2 | HEIGHT: 69 IN | HEART RATE: 88 BPM

## 2025-01-17 DIAGNOSIS — Z83.719 FAMILY HISTORY OF POLYPS IN THE COLON: ICD-10-CM

## 2025-01-17 DIAGNOSIS — Z80.0 FAMILY HISTORY OF COLON CANCER: ICD-10-CM

## 2025-01-17 DIAGNOSIS — K21.9 GASTROESOPHAGEAL REFLUX DISEASE, UNSPECIFIED WHETHER ESOPHAGITIS PRESENT: ICD-10-CM

## 2025-01-17 DIAGNOSIS — R13.19 ESOPHAGEAL DYSPHAGIA: Primary | ICD-10-CM

## 2025-01-17 PROCEDURE — 99214 OFFICE O/P EST MOD 30 MIN: CPT | Performed by: NURSE PRACTITIONER

## 2025-01-17 RX ORDER — LANSOPRAZOLE 30 MG/1
30 CAPSULE, DELAYED RELEASE ORAL DAILY
Qty: 30 CAPSULE | Refills: 5 | Status: SHIPPED | OUTPATIENT
Start: 2025-01-17

## 2025-01-17 NOTE — PROGRESS NOTES
DATE:  1/17/2025    REASON FOR FOLLOW UP: GERD, dysphagia    REFERRING PHYSICIAN:  ENRIQUE Bowser     CHIEF COMPLAINT:  Follow-up of GERD    HISTORY OF PRESENT ILLNESS:   Fidelina Osman is a very pleasant 49 y.o. female who is being seen today at the request of ENRIQUE Bowser  for evaluation and treatment of GERD. Ms. Garcia reports having difficulty with GERD for the past ~2 years.  She has previously tried omeprazole and Protonix without improvement.  She has recently been taking samples of a medication which she takes once daily provided by her PCP which has been helpful.  However, she is unsure of the name of this medication.  At present, she reports having daily flares with burning in her throat/chest and regurgitation.  She reports her flares are worse in the evenings.  She denies having epigastric pain.  She complains of dysphagia particularly with solids (breads/meats).  She feels like food will get stuck in her upper esophagus.  In the past, she has gotten choked on pizza and hamburger meat.  Of note, she retains her gallbladder.  She denies nausea or vomiting.  She reports all types of foods cause exacerbation of her symptoms.  She admits to drinking 1 red bull every morning and 2 cans of soda each day.  Her weight has been stable.  She denies smoking.  She reports her bowels move well.  She denies melena or bright red bleeding per rectum.  She has never had a screening colonoscopy.  She reports family history of colon cancer in her maternal grandmother.  She denies any first-degree relatives with colon cancer.  She reports her mother has history of colon polyps.  She has no other complaints today.    INTERVAL HISTORY:  Ms. Osman presents today for follow-up.  Since her last visit, clinically she has been doing well.  She remains on lansoprazole 30 mg p.o. twice daily which is controlling GERD well.  She denies having any recent flares.  Of note, patient says she was previously  receiving Toradol injection once monthly for chronic back pain.  Patient says she stopped receiving Toradol approximately 2 months ago which she believes has helped with her UGI symptoms significantly.  She has also made dietary modifications including avoiding spicy and acidic foods.  Patient denies drinking coffee but does drink 1 red bull per day.  She currently denies having dysphagia.  She has recently been struggling with mild constipation.  She reports having a BM every other day with stool type I or III on [place] stool scale.  She has not tried taking any over-the-counter stool softeners.  She denies having melena, hematochezia or rectal bleeding.  She has no other complaints today.    PAST MEDICAL HISTORY:  Past Medical History:   Diagnosis Date    Arthritis     Asthma     Endometriosis     Hiatal hernia     Low back pain     Lumbosacral disc disease        PAST SURGICAL HISTORY:  Past Surgical History:   Procedure Laterality Date     SECTION      COLONOSCOPY N/A 2024    Procedure: COLONOSCOPY FOR SCREENING;  Surgeon: Autumn Mckee MD;  Location: [facility];  Service: Gastroenterology;  Laterality: N/A;    ENDOSCOPY N/A 2024    Procedure: ESOPHAGOGASTRODUODENOSCOPY WITH BIOPSY;  Surgeon: Autumn Mckee MD;  Location: [facility];  Service: Gastroenterology;  Laterality: N/A;    HYSTEROSCOPY ENDOMETRIAL ABLATION      WISDOM TOOTH EXTRACTION         FAMILY HISTORY:  Family History   Problem Relation Age of Onset    Asthma Mother     Arthritis Mother     Breast cancer Neg Hx        SOCIAL HISTORY:  Social History     Socioeconomic History    Marital status: Single   Tobacco Use    Smoking status: Former     Types: Cigarettes    Smokeless tobacco: Never   Vaping Use    Vaping status: Never Used   Substance and Sexual Activity    Alcohol use: Never    Drug use: Never    Sexual activity: Defer     MEDICATIONS:  The current medication list was reviewed in the  "EMR    Current Outpatient Medications:     albuterol sulfate  (90 Base) MCG/ACT inhaler, Ventolin HFA 90 mcg/actuation aerosol inhaler  INHALE 2 PUFFS BY MOUTH EVERY 4 HOURS AS NEEDED, Disp: , Rfl:     Anefrin Spray 0.05 % nasal spray, USE 3 SPRAYS IN EACH NOSTRIL EVERY 12 HOURS FOR 5 DAYS AS NEEDED FOR NASAL CONGESTION, Disp: , Rfl:     baclofen (LIORESAL) 10 MG tablet, Take 1 tablet by mouth 2 (Two) Times a Day., Disp: , Rfl:     buPROPion (WELLBUTRIN) 75 MG tablet, Take 1 tablet by mouth Daily., Disp: , Rfl:     Dulera 200-5 MCG/ACT inhaler, Inhale 2 puffs 2 (Two) Times a Day., Disp: , Rfl:     lansoprazole (PREVACID) 30 MG capsule, Take 1 capsule by mouth 2 (Two) Times a Day. Take one capsule 30 minutes prior to eating breakfast, Disp: 60 capsule, Rfl: 5    loratadine (CLARITIN) 10 MG tablet, Take 1 tablet by mouth Every Morning., Disp: , Rfl:     methocarbamol (ROBAXIN) 750 MG tablet, Take 1 tablet by mouth 3 (Three) Times a Day., Disp: , Rfl:     montelukast (SINGULAIR) 10 MG tablet, Take 1 tablet by mouth every night at bedtime., Disp: , Rfl:     pregabalin (LYRICA) 100 MG capsule, Take 1 capsule by mouth Every 12 (Twelve) Hours., Disp: , Rfl:     sodium-potassium-magnesium sulfates (SUPREP) 17.5-3.13-1.6 GM/177ML solution oral solution, Take 1 bottle by mouth Take As Directed for 2 doses. Take one bottle with 16oz of water. Then within the hour drink an additional 32oz of water., Disp: 354 mL, Rfl: 0    ALLERGIES:  No Known Allergies      REVIEW OF SYSTEMS:    A comprehensive 14 point review of systems was performed.  Significant findings as mentioned above.  All other systems reviewed and are negative.      Physical Exam   Vital Signs: /68 (BP Location: Left arm, Patient Position: Sitting, Cuff Size: Large Adult)   Pulse 88   Ht 175.3 cm (69.02\")   Wt 75.3 kg (166 lb)   BMI 24.50 kg/m²    General: Well developed, well nourished, alert and oriented x 3, in no acute distress.   Head: ATNC "   Eyes: PERRL, No evidence of conjunctivitis.   Nose: No nasal discharge.   Mouth: Oral mucosal membranes moist. No oral ulceration or hemorrhages.   Neck: Neck supple. No thyromegaly. No JVD.   Lungs: Clear in all fields to A&P without rales, rhonchi or wheezing.   Heart: . Regular rate and rhythm. No murmurs, rubs, or gallops.   Abdomen: Soft. Bowel sounds are normoactive. Nontender with palpation.   Extremities: No cyanosis or edema.   Neurologic: Grossly non-focal exam      ASSESSMENT & PLAN:  Fidelina Osman is a very pleasant 49 y.o. female with    1.  GERD:  2.  Dysphagia (now resolved):    -She underwent EGD with Dr. Rice on 6/5/2024 and noted esophageal mucosal changes suggestive of eosinophilic esophagitis.  Biopsies were taken of the distal esophagus which revealed reflux esophagitis.  Biopsies of the midesophagus were negative for eosinophilic esophagitis.   -Subsequently she underwent esophagram on 6/17/2024 which revealed small hiatal hernia, otherwise unremarkable.  There was no evidence of stricture or dysmotility of the esophagus.    -Based on current history above, recommended to continue lansoprazole 30 mg and patient was advised to attempt to de-escalate treatment to once daily.  Refills provided.  If she were to begin having consistent flares throughout the week, she can always increase dose back to twice daily.  Happily, dysphagia has resolved.  Will monitor.  -We again discussed lifestyle changes including weight loss and important dietary modifications, limiting triggers such as caffeine, chocolate, spicy foods, acidic foods, fried/greasy foods, food with high fat content and carbonated beverages.    3.  Personal history of colon polyp (hyperplastic):  4.  Family history of colon polyps (mother):  5.  Family history of colon cancer (maternal grandmother):  6.  Constipation:    -Recommended patient start MiraLAX 1 capful daily as needed for mild/intermittent constipation.  -Patient  underwent colonoscopy with Dr. Rice on 6/5/2024 and had a polyp removed which was hyperplastic. Given patient's mother has history of several precancerous colon polyps as well as family history of colon cancer in her maternal grandmother, would recommend repeat colonoscopy in 5 years.   We have reviewed findings/recommendations.    Patient will return to clinic in 6 months for symptom check or sooner if needed.    The patient was in agreement with the plan and all questions were answered to her satisfaction.     Thank you so much for allowing us to participate in the care of Fidelina Osman . Please do not hesitate to contact us with any questions or concerns.             Electronically Signed by: ENRIQUE Maza , January 17, 2025 09:23 EST       CC:   Liz Daugherty APRN

## 2025-06-24 ENCOUNTER — HOSPITAL ENCOUNTER (EMERGENCY)
Facility: HOSPITAL | Age: 50
Discharge: HOME | End: 2025-06-25
Payer: COMMERCIAL

## 2025-06-24 ENCOUNTER — APPOINTMENT (OUTPATIENT)
Dept: GENERAL RADIOLOGY | Facility: HOSPITAL | Age: 50
End: 2025-06-24
Payer: COMMERCIAL

## 2025-06-24 VITALS
DIASTOLIC BLOOD PRESSURE: 78 MMHG | HEIGHT: 69 IN | TEMPERATURE: 98.2 F | WEIGHT: 164 LBS | BODY MASS INDEX: 24.29 KG/M2 | OXYGEN SATURATION: 100 % | SYSTOLIC BLOOD PRESSURE: 108 MMHG | RESPIRATION RATE: 18 BRPM | HEART RATE: 79 BPM

## 2025-06-24 DIAGNOSIS — S81.011A: Primary | ICD-10-CM

## 2025-06-24 PROCEDURE — 73562 X-RAY EXAM OF KNEE 3: CPT

## 2025-06-24 PROCEDURE — 25010000002 BUPIVACAINE (PF) 0.25 % SOLUTION

## 2025-06-24 PROCEDURE — 99283 EMERGENCY DEPT VISIT LOW MDM: CPT

## 2025-06-24 PROCEDURE — 73562 X-RAY EXAM OF KNEE 3: CPT | Performed by: RADIOLOGY

## 2025-06-24 RX ORDER — BUPIVACAINE HYDROCHLORIDE 2.5 MG/ML
30 INJECTION, SOLUTION EPIDURAL; INFILTRATION; INTRACAUDAL; PERINEURAL ONCE
Status: COMPLETED | OUTPATIENT
Start: 2025-06-24 | End: 2025-06-24

## 2025-06-24 RX ORDER — OXYCODONE AND ACETAMINOPHEN 5; 325 MG/1; MG/1
1 TABLET ORAL ONCE
Refills: 0 | Status: COMPLETED | OUTPATIENT
Start: 2025-06-24 | End: 2025-06-24

## 2025-06-24 RX ADMIN — CEPHALEXIN 500 MG: 250 CAPSULE ORAL at 22:58

## 2025-06-24 RX ADMIN — OXYCODONE HYDROCHLORIDE AND ACETAMINOPHEN 1 TABLET: 5; 325 TABLET ORAL at 22:58

## 2025-06-24 RX ADMIN — BUPIVACAINE HYDROCHLORIDE 30 ML: 2.5 INJECTION, SOLUTION EPIDURAL; INFILTRATION; INTRACAUDAL; PERINEURAL at 22:58

## 2025-06-25 PROCEDURE — 25010000002 TETANUS-DIPHTH-ACELL PERTUSSIS 5-2.5-18.5 LF-MCG/0.5 SUSPENSION PREFILLED SYRINGE

## 2025-06-25 PROCEDURE — 90471 IMMUNIZATION ADMIN: CPT

## 2025-06-25 PROCEDURE — 25010000002 BUPIVACAINE (PF) 0.25 % SOLUTION

## 2025-06-25 PROCEDURE — 90715 TDAP VACCINE 7 YRS/> IM: CPT

## 2025-06-25 RX ORDER — CEPHALEXIN 500 MG/1
500 CAPSULE ORAL 3 TIMES DAILY
Qty: 21 CAPSULE | Refills: 0 | Status: SHIPPED | OUTPATIENT
Start: 2025-06-25

## 2025-06-25 RX ORDER — OXYCODONE AND ACETAMINOPHEN 5; 325 MG/1; MG/1
1 TABLET ORAL EVERY 6 HOURS PRN
Qty: 12 TABLET | Refills: 0 | Status: SHIPPED | OUTPATIENT
Start: 2025-06-25

## 2025-06-25 RX ORDER — BACITRACIN ZINC, NEOMYCIN SULFATE AND POLYMYXIN B SULFATE 400; 5; 5000 [IU]/G; MG/G; [IU]/G
3 OINTMENT TOPICAL ONCE
Status: COMPLETED | OUTPATIENT
Start: 2025-06-25 | End: 2025-06-25

## 2025-06-25 RX ADMIN — BACITRACIN ZINC, NEOMYCIN, POLYMYXIN B 3 APPLICATION: 400; 3.5; 5 OINTMENT TOPICAL at 00:55

## 2025-06-25 RX ADMIN — TETANUS TOXOID, REDUCED DIPHTHERIA TOXOID AND ACELLULAR PERTUSSIS VACCINE, ADSORBED 0.5 ML: 5; 2.5; 8; 8; 2.5 SUSPENSION INTRAMUSCULAR at 01:17

## 2025-06-25 NOTE — DISCHARGE INSTRUCTIONS
Sutures out in 2 weeks.    No strenuous activity for the next 4 to 5 days.  Give your body time to read establish the blood supply to the skin flap.

## 2025-06-25 NOTE — ED PROVIDER NOTES
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  They were getting the boat back into his storage.  And somehow the bolts for the spare tire he got into the back of her right knee and created a Wies shaped laceration back there.    The laceration is goes down to the sheath that surrounds the knee.  I cannot see any sign of the popliteal artery.  There is no disruption of the sheath or surrounding the knee.    Patient has a good distal pulse with good capillary refill.            Review of Systems   Constitutional:  Negative for activity change and fatigue.   HENT:  Negative for congestion, mouth sores and sneezing.    Eyes:  Negative for discharge.   Respiratory:  Negative for shortness of breath.    Cardiovascular:  Negative for chest pain.   Gastrointestinal:  Negative for abdominal distention and abdominal pain.   Endocrine: Negative for cold intolerance.   Genitourinary:  Negative for difficulty urinating and dysuria.   Musculoskeletal:  Negative for arthralgias.   Skin:         V-shaped laceration on the back of the knee.  The laceration is about 4 to 4-1/2 inches long.   Neurological:  Negative for dizziness.   Psychiatric/Behavioral:  Negative for behavioral problems.    All other systems reviewed and are negative.      Past Medical History:   Diagnosis Date    Arthritis     Asthma     Endometriosis     Hiatal hernia     Low back pain     Lumbosacral disc disease        No Known Allergies    Past Surgical History:   Procedure Laterality Date     SECTION      COLONOSCOPY N/A 2024    Procedure: COLONOSCOPY FOR SCREENING;  Surgeon: Autumn Mckee MD;  [facility];  Service: Gastroenterology;  Laterality: N/A;    ENDOSCOPY N/A 2024    Procedure: ESOPHAGOGASTRODUODENOSCOPY WITH BIOPSY;  Surgeon: Autumn Mckee MD;  [facility];  Service:  Gastroenterology;  Laterality: N/A;    HYSTEROSCOPY ENDOMETRIAL ABLATION      WISDOM TOOTH EXTRACTION         Family History   Problem Relation Age of Onset    Asthma Mother     Arthritis Mother     Breast cancer Neg Hx        Social History     Socioeconomic History    Marital status: Single   Tobacco Use    Smoking status: Former     Types: Cigarettes    Smokeless tobacco: Never   Vaping Use    Vaping status: Never Used   Substance and Sexual Activity    Alcohol use: Never    Drug use: Never    Sexual activity: Defer           Objective   Physical Exam  HENT:      Head: Normocephalic.      Right Ear: External ear normal.      Left Ear: External ear normal.      Mouth/Throat:      Mouth: Mucous membranes are moist.   Eyes:      Extraocular Movements: Extraocular movements intact.      Pupils: Pupils are equal, round, and reactive to light.   Cardiovascular:      Rate and Rhythm: Normal rate and regular rhythm.      Heart sounds: No murmur heard.  Pulmonary:      Effort: Pulmonary effort is normal.      Breath sounds: Normal breath sounds.   Abdominal:      General: Abdomen is flat. Bowel sounds are normal.      Palpations: Abdomen is soft.   Musculoskeletal:         General: No swelling. Normal range of motion.      Cervical back: Normal range of motion. No rigidity.   Skin:     General: Skin is warm and dry.      Capillary Refill: Capillary refill takes less than 2 seconds.   Neurological:      General: No focal deficit present.      Mental Status: She is alert.   Psychiatric:         Mood and Affect: Mood normal.         Laceration Repair    Date/Time: 6/25/2025 11:30 PM    Performed by: Moody Guy MD  Authorized by: Moody Guy MD    Consent:     Consent obtained:  Verbal    Alternatives discussed:  No treatment and delayed treatment  Universal protocol:     Patient identity confirmed:  Verbally with patient  Anesthesia:     Anesthesia method:  Local infiltration    Local anesthetic:   Bupivacaine 0.25% w/o epi  Laceration details:     Location:  Leg    Leg location:  R knee    Length (cm):  10    Depth (mm):  1  Pre-procedure details:     Preparation:  Patient was prepped and draped in usual sterile fashion and imaging obtained to evaluate for foreign bodies  Exploration:     Limited defect created (wound extended): no      Hemostasis achieved with:  Direct pressure    Imaging obtained: x-ray      Imaging outcome: foreign body not noted      Wound exploration: wound explored through full range of motion and entire depth of wound visualized      Wound extent: no fascia violation noted, no foreign bodies/material noted, no muscle damage noted, no nerve damage noted, no tendon damage noted, no underlying fracture noted and no vascular damage noted      Contaminated: no    Treatment:     Area cleansed with:  Povidone-iodine    Amount of cleaning:  Extensive    Irrigation solution:  Sterile water    Irrigation volume:  1 liter    Irrigation method:  Pressure wash    Visualized foreign bodies/material removed: no      Debridement:  None    Undermining:  None    Scar revision: no      Layers/structures repaired:  Deep subcutaneous  Deep subcutaneous:     Suture size:  3-0    Suture material:  Vicryl    Suture technique:  Vertical mattress    Number of sutures:  3  Skin repair:     Repair method:  Sutures    Suture size:  4-0    Suture material:  Prolene    Suture technique:  Running locked    Number of sutures:  12  Approximation:     Approximation:  Loose  Repair type:     Repair type:  Intermediate  Post-procedure details:     Dressing:  Antibiotic ointment, non-adherent dressing and sterile dressing    Procedure completion:  Tolerated (Had trouble getting some of the skin numbed up.)  Comments:      Tell her rated well.             ED Course                                                       Medical Decision Making  Patient sutured up.  She tolerated relatively well.  There is some areas of the  skin I could not get numbed up and I did not feel like trying to infiltrate the thin layer of dermis to get it numbed up.  I did not want to risk the blood supply.    Problems Addressed:  Laceration of knee with complication, right, initial encounter: complicated acute illness or injury    Amount and/or Complexity of Data Reviewed  Radiology: ordered.    Risk  OTC drugs.  Prescription drug management.        Final diagnoses:   Laceration of knee with complication, right, initial encounter       ED Disposition  ED Disposition       ED Disposition   Discharge    Condition   Stable    Comment   --               Day, Liz Rico, APRN  [address] [facility] [address] [phone]      If the wound starts getting red pussy drainage looking infected return to the ER or see Ms. day.         Medication List        New Prescriptions      cephalexin 500 MG capsule  Commonly known as: KEFLEX  Take 1 capsule by mouth 3 (Three) Times a Day.     oxyCODONE-acetaminophen 5-325 MG per tablet  Commonly known as: PERCOCET  Take 1 tablet by mouth Every 6 (Six) Hours As Needed for Moderate Pain.               Where to Get Your Medications        These medications were sent to [id number] - [place] - [address] AT [facility] OF [address] - [phone]  - [phone] FX  [address] [place] [address]      Phone: [phone]   cephalexin 500 MG capsule  oxyCODONE-acetaminophen 5-325 MG per tablet            Moody Guy MD  06/25/25 0156

## 2025-06-25 NOTE — ED NOTES
Medication applied to non-adherent pad and dressed to pt's laceration. Wound wrapped with 4 in ace wrap. Pt tolerated well.

## 2025-07-09 ENCOUNTER — OFFICE VISIT (OUTPATIENT)
Dept: PAIN MEDICINE | Facility: CLINIC | Age: 50
End: 2025-07-09
Payer: COMMERCIAL

## 2025-07-09 VITALS — WEIGHT: 163 LBS | BODY MASS INDEX: 24.07 KG/M2

## 2025-07-09 DIAGNOSIS — G89.4 CHRONIC PAIN SYNDROME: ICD-10-CM

## 2025-07-09 DIAGNOSIS — M47.817 LUMBOSACRAL SPONDYLOSIS WITHOUT MYELOPATHY: ICD-10-CM

## 2025-07-09 DIAGNOSIS — M46.1 SACROILIITIS: ICD-10-CM

## 2025-07-09 DIAGNOSIS — M62.5A2 ATROPHY OF MUSCLE OF BACK AT LUMBOSACRAL LEVEL: Primary | ICD-10-CM

## 2025-07-09 PROCEDURE — 99213 OFFICE O/P EST LOW 20 MIN: CPT

## 2025-07-09 NOTE — PROGRESS NOTES
Patient reports she has had variable primary Physician: Liz Daugherty APRN    CHIEF COMPLAINT or REASON FOR VISIT: Back Pain      Initial HPI 3-2-2023:  Ms. Fidelina Osman is 50 y.o. female who presents as a new patient referral for evaluation treatment of chronic right-sided low back pain with radiation of the right hip and thigh.  Patient states that she has had this issue for approximately 10 years since falling onto her right-sided buttock area in 2014.  This is an old Worker's Comp. injury, patient currently pursuing disability.  She describes a lifelong history of ligamentous laxity including joint hypermobility and pelvic shifting.  She has completed physical therapy and engages routinely in chiropractic manipulation.  She was told by her physical therapist that she has a leg length discrepancy, recently started using a left shoe spacer.  Her sister also has the same ligamentous laxity.    She reports chronic achy pain at the left buttock radiating into the right posterior thigh terminating above the knee.  She denies numbness or tingling.  She does have to sit leaning away from her right side.  Around 2014 she underwent 6 interventional pain injections including lumbar epidural steroid injections.  She states the epidural injections only helped for about 1 week.  She subsequently had right-sided sacroiliac joint injections which provided over a month of relief.    She did see consultation with neurosurgery, Dr. Israel Moralez, who recommended conservative management including diagnostic injections to determine etiology of her pain.    Interval history:   Patient returns to clinic today.    Interventions:  Patient returns to clinic today.  She continues to complain of chronic right-sided low back/buttock pain as well as diffuse axial low back pain.  She continues to go to the chiropractor which helps provide her with some relief.  She feels that if her symptoms worsen in the fall.  Overall, she is  tolerating her pain from the moment.  She did have an instance recently where she fell and cut the back of her leg.  This did require over 20 stitches to close.  She has finished antibiotics for this and denies any signs of systemic or local infection.    3/21/2023: Right SIJ injection with 100% relief for 3 weeks, now 75% relief 2 to 3 months  2023: Repeat right SIJ with 75% relief for 3-4 months  2024: Repeat right SIJ with variable relief.  9/10/24: bilateral L3/4 and L4/5 LMBB without relief    Objective Pain Scoring:   BRIEF PAIN INVENTORY:  Total score:   Pain Score    25 1401   PainSc: 6    PainLoc: Back        PHQ-2:    PHQ-9:    Opioid Risk Tool:         Review of Systems:   ROS negative except as otherwise noted     Past Medical History:   Past Medical History:   Diagnosis Date    Arthritis     Asthma     Endometriosis     Hiatal hernia     Low back pain     Lumbosacral disc disease          Past Surgical History:   Past Surgical History:   Procedure Laterality Date     SECTION      COLONOSCOPY N/A 2024    Procedure: COLONOSCOPY FOR SCREENING;  Surgeon: Autumn Mckee MD;  Location: [facility];  Service: Gastroenterology;  Laterality: N/A;    ENDOSCOPY N/A 2024    Procedure: ESOPHAGOGASTRODUODENOSCOPY WITH BIOPSY;  Surgeon: Autumn Mckee MD;  Location: [facility];  Service: Gastroenterology;  Laterality: N/A;    HYSTEROSCOPY ENDOMETRIAL ABLATION      WISDOM TOOTH EXTRACTION           Family History   Family History   Problem Relation Age of Onset    Asthma Mother     Arthritis Mother     Breast cancer Neg Hx          Social History   Social History     Socioeconomic History    Marital status: Single   Tobacco Use    Smoking status: Former     Types: Cigarettes    Smokeless tobacco: Never   Vaping Use    Vaping status: Never Used   Substance and Sexual Activity    Alcohol use: Never    Drug use: Never    Sexual activity: Defer        Medications:      Current Outpatient Medications:     albuterol sulfate  (90 Base) MCG/ACT inhaler, Ventolin HFA 90 mcg/actuation aerosol inhaler  INHALE 2 PUFFS BY MOUTH EVERY 4 HOURS AS NEEDED, Disp: , Rfl:     Anefrin Spray 0.05 % nasal spray, USE 3 SPRAYS IN EACH NOSTRIL EVERY 12 HOURS FOR 5 DAYS AS NEEDED FOR NASAL CONGESTION, Disp: , Rfl:     baclofen (LIORESAL) 10 MG tablet, Take 1 tablet by mouth 2 (Two) Times a Day., Disp: , Rfl:     buPROPion (WELLBUTRIN) 75 MG tablet, Take 1 tablet by mouth Daily., Disp: , Rfl:     cephalexin (KEFLEX) 500 MG capsule, Take 1 capsule by mouth 3 (Three) Times a Day., Disp: 21 capsule, Rfl: 0    Dulera 200-5 MCG/ACT inhaler, Inhale 2 puffs 2 (Two) Times a Day., Disp: , Rfl:     lansoprazole (PREVACID) 30 MG capsule, Take 1 capsule by mouth Daily. Take one capsule 30 minutes prior to eating breakfast, Disp: 30 capsule, Rfl: 5    loratadine (CLARITIN) 10 MG tablet, Take 1 tablet by mouth Every Morning., Disp: , Rfl:     methocarbamol (ROBAXIN) 750 MG tablet, Take 1 tablet by mouth 3 (Three) Times a Day., Disp: , Rfl:     montelukast (SINGULAIR) 10 MG tablet, Take 1 tablet by mouth every night at bedtime., Disp: , Rfl:     oxyCODONE-acetaminophen (PERCOCET) 5-325 MG per tablet, Take 1 tablet by mouth Every 6 (Six) Hours As Needed for Moderate Pain., Disp: 12 tablet, Rfl: 0    pregabalin (LYRICA) 100 MG capsule, Take 1 capsule by mouth Every 12 (Twelve) Hours., Disp: , Rfl:     sodium-potassium-magnesium sulfates (SUPREP) 17.5-3.13-1.6 GM/177ML solution oral solution, Take 1 bottle by mouth Take As Directed for 2 doses. Take one bottle with 16oz of water. Then within the hour drink an additional 32oz of water., Disp: 354 mL, Rfl: 0        Physical Exam:     Vitals:    07/09/25 1401   Weight: 73.9 kg (163 lb)   PainSc: 6    PainLoc: Back          General: Alert and oriented, No acute distress.   HEENT: Normocephalic, atraumatic.   Cardiovascular: No gross edema  Respiratory: Respirations  are non-labored    Thoracic Spine:   Inspection: no gross abnormality  Paraspinal muscle palpation: nontender  Spinous process palpation: nontender    Lumbar Spine:   No masses or atrophy  Range of motion - Flexion normal. Extension normal.   Facet Loading: Positive bilaterally  Facet Palpation -positive bilaterally  PSIS tenderness -positive bilaterally right greater than left  Tino's/GREG/Thigh thrust -positive bilaterally right greater than left  Straight leg raise: Negative bilaterally  Slump test: Negative bilaterally  Multifidus toe-touch test: Positive  Multifidus lift test: Positive bilaterally    Motor Exam:    Strength: Rate on 1-5 scale Right Left    L1/2- hip flexion 5 5    L3- knee extension 5 5    L4- ankle dorsiflexion 5 5    L5- great toe extension 5 5    S1- ankle plantarflexion 5 5    Sensory Exam: Full and equal sensation to light touch throughout.    Neurologic: Cranial Nerves II-XII are grossly intact.   Clonus -negative bilaterally  Psychiatric: Cooperative.   Gait: Normal   Assistive Devices: None    Maceration within the right popliteal space which has been closed with sutures.  Currently in the process of healing.    Imaging Studies:   Results for orders placed during the hospital encounter of 11/18/22    MRI Lumbar Spine Without Contrast    Narrative  EXAMINATION: MRI LUMBAR SPINE WO CONTRAST-    CLINICAL INDICATION: M54.50; M54.50-Low back pain, unspecified    COMPARISON: None    TECHNIQUE: Multiplanar, multisequence MR imaging performed through the  lumbar spine WITHOUT contrast.    FINDINGS:    HARDWARE: No MRI evidence of hardware.    NUMBERING/ALIGNMENT:?  5 lumbar vertebral body segments.  Intact vertebral body alignment.    SPINAL CORD:?  Conus terminates at the?L1 level.    BONES:  No fracture. No marrow signal abnormality.    POSTERIOR ELEMENTS:  Posterior elements are intact.    SOFT TISSUES:  The visualized paraspinal soft tissues are unremarkable.    T12-L1:  No disk bulge  or protrusion.  No central canal or neuroforaminal  stenosis.    L1/2:  No disk bulge or protrusion.  No central canal or neuroforaminal  stenosis.    L2/3:  Mild annular disc bulge and small superimposed 2 mm central disc  protrusion. No stenosis. No annular tear.    L3/4:  Broad-based posterior disc bulge eccentric to left. Mild facet  arthropathy. Mild central canal and left neural foraminal stenosis.    L4/5:  Broad-based posterior disc bulge. Moderate facet arthropathy.  Mild-moderate central canal stenosis. Mild neural foraminal stenosis.    L5/S1:  No disk bulge or protrusion.  No central canal or neuroforaminal  stenosis.    OTHER:  No additional remarkable findings.    Impression  1. Mild multilevel degenerative disc disease and mild to moderate facet  arthropathy with mild-moderate central canal and neural foraminal  stenosis at the L3/4 and L4/5 levels.  2. No fracture or traumatic malalignment.    This report was finalized on 11/18/2022 2:23 PM by Dr. Parmjit Scales MD.      Impression & Plan:   3/2/2023: Fidelina Osman is a 50 y.o. female with past medical history significant for work fall in 2014 who presents to the pain clinic for evaluation and treatment of right-sided buttock pain.  I personally reviewed the patient's lumbar MRI dated 11/18/2022 which demonstrates moderate degenerative disc disease at L3/4 and L4/5 with Modic 1 endplate changes at L3/4; no significant canal or neuroforaminal stenosis.  Clinical examination most consistent with right sacroiliac joint pain versus greater trochanteric bursitis.  Given significant benefit from prior SIJ injections reasonable to repeat diagnostic and therapeutic right sacroiliac joint injection.  If short-term relief, will proceed with second diagnostic SIJ injection and can consider right sacroiliac joint fusion.  If no benefit whatsoever can consider right L4/5 transforaminal epidural steroid injection, SCS trial.  4/24/2023: Excellent relief with right  SIJ.  Discussed repeat every 3 to 4 months versus fusion.  7/26/2023: Return of right SIJ pain.  We will schedule repeat right SIJ injection.  4/1/2024: Good relief from repeat right SIJ.  Will schedule repeat injection.  Can consider right SI joint fusion with PainTeq  8/15/2024: Variable relief from repeat SIJ.  Symptoms more consistent with lumbosacral spondylosis.  Will proceed with bilateral L3/4 and L4/5 LMBB and RFA if appropriate risk of procedure include bleeding, infection, damage to surrounding structures which could exacerbate symptoms  9/30/2024: Good relief from prior SIJ injections.  Pain is returned and patient would like to repeat.  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  We discussed restorative neuromodulation for this issue.  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  I had a discussion with the patient regarding the risks of the surgery including bleeding, infection, damage to surrounding structures.  1/9/2025: Discussed restorative neuromodulation for multifidus atrophy.  Patient interested in this procedure but is hesitant to schedule at this time.  She prefers 6-month follow-up but will reach out to the office if she is interested in scheduling this procedure.  7/9/2025: Can consider repeat right SIJ, restorative neuromodulation for multifidus atrophy.  Patient not interested in neuromodulation at the moment.    1. Atrophy of muscle of back at lumbosacral level    2. Chronic pain syndrome    3. Sacroiliitis    4. Lumbosacral spondylosis without myelopathy            PLAN:  1. Medication Recommendations: Recommend Voltaren topical, NSAIDs, Tylenol.  Can trial turmeric 500 mg twice daily if NSAID contraindicated.    2. Physical Therapy: Continue HEP    3. Psychological: defer    4. Complementary and alternative (CAM) Therapies:     5. Labs: None indicated     6. Imaging: MRI findings demonstrate bilateral multifidus  dysfunction.    7. Interventions: Can consider scheduling ReActiv8 restorative neurostimulation peripheral nerve stimulator ([id number], [id number]) if patient is interested  -Can consider repeat right SIJ    8. Referrals:    9. Records requested: n/a    10. Lifestyle goals:    Follow-up 6-months in [facility]  Gauri Escalona PA-C    Quality metrics:  Fidelina Osman reports a pain score of 6.  Given her pain assessment as noted, treatment options were discussed and the following options were decided upon as a follow-up plan to address the patient's pain: continuation of current treatment plan for pain.

## (undated) DEVICE — THE BITE BLOCK MAXI, LATEX FREE STRAP IS USED TO PROTECT THE ENDOSCOPE INSERTION TUBE FROM BEING BITTEN BY THE PATIENT.

## (undated) DEVICE — FRCP BX RADJAW4 NDL 2.8 240CM LG OG BX40

## (undated) DEVICE — Device: Brand: DEFENDO AIR/WATER/SUCTION AND BIOPSY VALVE

## (undated) DEVICE — Device

## (undated) DEVICE — CONN Y IRR DISP 1P/U

## (undated) DEVICE — ENDOGATOR AUXILIARY WATER JET CONNECTOR: Brand: ENDOGATOR